# Patient Record
Sex: FEMALE | Race: WHITE | NOT HISPANIC OR LATINO | Employment: PART TIME
[De-identification: names, ages, dates, MRNs, and addresses within clinical notes are randomized per-mention and may not be internally consistent; named-entity substitution may affect disease eponyms.]

---

## 2017-01-09 ENCOUNTER — HISTORIC RESULTS (OUTPATIENT)
Dept: ADMINISTRATIVE | Age: 27
End: 2017-01-09

## 2020-08-23 ENCOUNTER — RESULTS ONLY (OUTPATIENT)
Dept: LAB | Age: 30
End: 2020-08-23

## 2020-08-23 ENCOUNTER — MEDICAL CORRESPONDENCE (OUTPATIENT)
Dept: HEALTH INFORMATION MANAGEMENT | Facility: OTHER | Age: 30
End: 2020-08-23

## 2020-08-26 LAB
SARS-COV-2 RNA SPEC QL NAA+PROBE: NOT DETECTED
SPECIMEN SOURCE: NORMAL

## 2020-09-18 ENCOUNTER — OFFICE VISIT (OUTPATIENT)
Dept: FAMILY MEDICINE | Facility: OTHER | Age: 30
End: 2020-09-18
Attending: FAMILY MEDICINE
Payer: COMMERCIAL

## 2020-09-18 VITALS
HEART RATE: 96 BPM | OXYGEN SATURATION: 98 % | TEMPERATURE: 99.6 F | DIASTOLIC BLOOD PRESSURE: 76 MMHG | BODY MASS INDEX: 35.65 KG/M2 | HEIGHT: 65 IN | WEIGHT: 214 LBS | SYSTOLIC BLOOD PRESSURE: 130 MMHG | RESPIRATION RATE: 18 BRPM

## 2020-09-18 DIAGNOSIS — R30.0 DYSURIA: Primary | ICD-10-CM

## 2020-09-18 LAB
ALBUMIN UR-MCNC: NEGATIVE MG/DL
APPEARANCE UR: CLEAR
BACTERIA #/AREA URNS HPF: ABNORMAL /HPF
BILIRUB UR QL STRIP: NEGATIVE
COLOR UR AUTO: YELLOW
GLUCOSE UR STRIP-MCNC: NEGATIVE MG/DL
HGB UR QL STRIP: ABNORMAL
KETONES UR STRIP-MCNC: NEGATIVE MG/DL
LEUKOCYTE ESTERASE UR QL STRIP: ABNORMAL
MUCOUS THREADS #/AREA URNS LPF: PRESENT /LPF
NITRATE UR QL: NEGATIVE
PH UR STRIP: 7 PH (ref 5–7)
RBC #/AREA URNS AUTO: 1 /HPF (ref 0–2)
SOURCE: ABNORMAL
SP GR UR STRIP: 1.01 (ref 1–1.03)
SQUAMOUS #/AREA URNS AUTO: 2 /HPF (ref 0–1)
UROBILINOGEN UR STRIP-MCNC: NORMAL MG/DL (ref 0–2)
WBC #/AREA URNS AUTO: 19 /HPF (ref 0–5)
YEAST #/AREA URNS HPF: ABNORMAL /HPF

## 2020-09-18 PROCEDURE — 99213 OFFICE O/P EST LOW 20 MIN: CPT | Performed by: FAMILY MEDICINE

## 2020-09-18 PROCEDURE — 87086 URINE CULTURE/COLONY COUNT: CPT | Mod: ZL | Performed by: FAMILY MEDICINE

## 2020-09-18 PROCEDURE — 81003 URINALYSIS AUTO W/O SCOPE: CPT | Mod: ZL | Performed by: FAMILY MEDICINE

## 2020-09-18 PROCEDURE — 87088 URINE BACTERIA CULTURE: CPT | Mod: ZL | Performed by: FAMILY MEDICINE

## 2020-09-18 RX ORDER — SULFAMETHOXAZOLE/TRIMETHOPRIM 800-160 MG
1 TABLET ORAL 2 TIMES DAILY
Qty: 10 TABLET | Refills: 0 | Status: SHIPPED | OUTPATIENT
Start: 2020-09-18 | End: 2020-09-23

## 2020-09-18 ASSESSMENT — MIFFLIN-ST. JEOR: SCORE: 1701.58

## 2020-09-18 ASSESSMENT — PAIN SCALES - GENERAL: PAINLEVEL: MILD PAIN (3)

## 2020-09-18 NOTE — PROGRESS NOTES
"  SUBJECTIVE:   Marilee Swanson is a 30 year old female who presents to clinic today for the following health issues:    Patient presents 1 week after onset of urinary frequency and dysuria. This morning she had increased pelvic pain, originating from her low back and radiating anteriorly.     T max at home 99.9 with chills at home.     No gross hematuria. No history of kidney stones. No appetite. + nausea, no emesis.     No vaginal discharge. No concerns for STDs.           Review of Systems  See HPI, All other systems reviewed and are otherwise negative.       OBJECTIVE:     /76 (BP Location: Left arm, Patient Position: Sitting, Cuff Size: Adult Regular)   Pulse 96   Temp 99.6  F (37.6  C) (Temporal)   Resp 18   Ht 1.651 m (5' 5\")   Wt 97.1 kg (214 lb)   LMP  (LMP Unknown)   SpO2 98%   Breastfeeding No   BMI 35.61 kg/m    Body mass index is 35.61 kg/m .  Physical Exam  Constitutional:       Appearance: She is well-developed.   HENT:      Right Ear: External ear normal.      Left Ear: External ear normal.   Eyes:      General: No scleral icterus.     Conjunctiva/sclera: Conjunctivae normal.   Cardiovascular:      Rate and Rhythm: Normal rate.   Pulmonary:      Effort: Pulmonary effort is normal. No respiratory distress.   Skin:     Findings: No rash.   Neurological:      Mental Status: She is alert.         Diagnostic Test Results:  Results for orders placed or performed in visit on 09/18/20 (from the past 24 hour(s))   UA reflex to Microscopic and Culture    Specimen: Midstream Urine   Result Value Ref Range    Color Urine Yellow     Appearance Urine Clear     Glucose Urine Negative NEG^Negative mg/dL    Bilirubin Urine Negative NEG^Negative    Ketones Urine Negative NEG^Negative mg/dL    Specific Gravity Urine 1.006 1.003 - 1.035    Blood Urine Moderate (A) NEG^Negative    pH Urine 7.0 5.0 - 7.0 pH    Protein Albumin Urine Negative NEG^Negative mg/dL    Urobilinogen mg/dL Normal 0.0 - 2.0 mg/dL "    Nitrite Urine Negative NEG^Negative    Leukocyte Esterase Urine Moderate (A) NEG^Negative    Source Midstream Urine     RBC Urine 1 0 - 2 /HPF    WBC Urine 19 (H) 0 - 5 /HPF    Bacteria Urine Moderate (A) NEG^Negative /HPF    Yeast Urine Few (A) NEG^Negative /HPF    Squamous Epithelial /HPF Urine 2 (H) 0 - 1 /HPF    Mucous Urine Present (A) NEG^Negative /LPF       ASSESSMENT/PLAN:           ICD-10-CM    1. Dysuria  R30.0 UA reflex to Microscopic and Culture     Urine Culture Aerobic Bacterial     sulfamethoxazole-trimethoprim (BACTRIM DS) 800-160 MG tablet     Would recommend empiric treatment with an antibiotic, while awaiting culture results. Return to clinic/ED if emesis prevents use of oral antibiotics, and/or fever spikes. Patient is concerned about pyelonephritis, but reassured that currently there are no clinical findings.     Delfina Hendrix MD  [facility] AND [facility]

## 2020-09-18 NOTE — NURSING NOTE
"Chief Complaint   Patient presents with     UTI     Patient presented to the clinic with a possible UTI. She first noticed symptoms about a week ago with frequency, and burning. Last night she started to have pain in her lower back that wrapped around the front and in to her pelvic area.    Initial /76 (BP Location: Left arm, Patient Position: Sitting, Cuff Size: Adult Regular)   Pulse 96   Temp 99.6  F (37.6  C) (Temporal)   Resp 18   Ht 1.651 m (5' 5\")   Wt 97.1 kg (214 lb)   LMP  (LMP Unknown)   SpO2 98%   Breastfeeding No   BMI 35.61 kg/m   Estimated body mass index is 35.61 kg/m  as calculated from the following:    Height as of this encounter: 1.651 m (5' 5\").    Weight as of this encounter: 97.1 kg (214 lb).    Medication Reconciliation: complete      Jaky Bah LPN  "

## 2020-09-20 LAB
BACTERIA SPEC CULT: ABNORMAL
SPECIMEN SOURCE: ABNORMAL

## 2020-12-09 ENCOUNTER — OFFICE VISIT (OUTPATIENT)
Dept: FAMILY MEDICINE | Facility: OTHER | Age: 30
End: 2020-12-09
Attending: NURSE PRACTITIONER
Payer: COMMERCIAL

## 2020-12-09 VITALS
TEMPERATURE: 99 F | HEART RATE: 84 BPM | RESPIRATION RATE: 16 BRPM | DIASTOLIC BLOOD PRESSURE: 74 MMHG | SYSTOLIC BLOOD PRESSURE: 128 MMHG | WEIGHT: 226 LBS | BODY MASS INDEX: 37.61 KG/M2

## 2020-12-09 DIAGNOSIS — N30.01 ACUTE CYSTITIS WITH HEMATURIA: Primary | ICD-10-CM

## 2020-12-09 DIAGNOSIS — R31.9 HEMATURIA, UNSPECIFIED TYPE: ICD-10-CM

## 2020-12-09 LAB
ALBUMIN UR-MCNC: NEGATIVE MG/DL
APPEARANCE UR: ABNORMAL
BACTERIA #/AREA URNS HPF: ABNORMAL /HPF
BILIRUB UR QL STRIP: NEGATIVE
COLOR UR AUTO: ABNORMAL
GLUCOSE UR STRIP-MCNC: NEGATIVE MG/DL
HGB UR QL STRIP: ABNORMAL
KETONES UR STRIP-MCNC: NEGATIVE MG/DL
LEUKOCYTE ESTERASE UR QL STRIP: ABNORMAL
MUCOUS THREADS #/AREA URNS LPF: PRESENT /LPF
NITRATE UR QL: NEGATIVE
PH UR STRIP: 7 PH (ref 5–7)
RBC #/AREA URNS AUTO: 80 /HPF (ref 0–2)
SOURCE: ABNORMAL
SP GR UR STRIP: 1.01 (ref 1–1.03)
SQUAMOUS #/AREA URNS AUTO: 3 /HPF (ref 0–1)
UROBILINOGEN UR STRIP-MCNC: NORMAL MG/DL (ref 0–2)
WBC #/AREA URNS AUTO: 7 /HPF (ref 0–5)
YEAST #/AREA URNS HPF: ABNORMAL /HPF

## 2020-12-09 PROCEDURE — 81001 URINALYSIS AUTO W/SCOPE: CPT | Mod: ZL | Performed by: NURSE PRACTITIONER

## 2020-12-09 PROCEDURE — 99213 OFFICE O/P EST LOW 20 MIN: CPT | Performed by: NURSE PRACTITIONER

## 2020-12-09 RX ORDER — SULFAMETHOXAZOLE/TRIMETHOPRIM 800-160 MG
1 TABLET ORAL 2 TIMES DAILY
Qty: 10 TABLET | Refills: 0 | Status: SHIPPED | OUTPATIENT
Start: 2020-12-09 | End: 2020-12-14

## 2020-12-09 ASSESSMENT — PAIN SCALES - GENERAL: PAINLEVEL: NO PAIN (0)

## 2020-12-09 NOTE — PATIENT INSTRUCTIONS

## 2020-12-09 NOTE — PROGRESS NOTES
HPI:    Marilee Swanson is a 30 year old female  who presents to [facility] today for hematuria. The patient states that she just noticed the blood in her urine yesterday. She reports having some urgency. Felt like she was starting to get a bladder infection about 1 week ago. At that time she took cranberry pills and her symptoms had resolved. Patient states that she is not noticing blood in the urine every time she urinates. Patient reports noticing one clot yesterday. The Patient has a history of bladder infections. Denies fevers or chills. Patient states that she last menstruated 2 weeks ago, this is not the cause of the gross hematuria.       Past Medical History:   Diagnosis Date     History of other genital system and obstetric disorders      - NSVDs at term     Past Surgical History:   Procedure Laterality Date     OTHER SURGICAL HISTORY      [id number],NO PREVIOUS SURGERY     Social History     Tobacco Use     Smoking status: Current Every Day Smoker     Packs/day: 0.01     Years: 2.00     Pack years: 0.02     Types: Cigarettes     Last attempt to quit: 2012     Years since quittin.2     Smokeless tobacco: Never Used   Substance Use Topics     Alcohol use: No     No current outpatient medications on file.     No Known Allergies      Past medical history, past surgical history, current medications and allergies reviewed and accurate to the best of my knowledge.        ROS:  Refer to HPI    /74 (BP Location: Right arm, Patient Position: Sitting, Cuff Size: Adult Regular)   Pulse 84   Temp 99  F (37.2  C) (Tympanic)   Resp 16   Wt 102.5 kg (226 lb)   BMI 37.61 kg/m      EXAM:  General Appearance: Well appearing female, appropriate appearance for age. No acute distress  Respiratory: normal chest wall and respirations.  Normal effort.  Clear to auscultation bilaterally, no wheezing, crackles or rhonchi.  No increased work of breathing.  No cough appreciated.  Cardiac: RRR with no  murmurs  Abdomen: soft, nontender, no rigidity, no rebound tenderness or guarding, normal bowel sounds present  :  No suprapubic tenderness to palpation.  No CVA tenderness to palpation.    Dermatological: no rashes noted of exposed skin  Psychological: normal affect, alert, oriented, and pleasant.       Labs:  Results for orders placed or performed in visit on 12/09/20   UA reflex to Microscopic and Culture     Status: Abnormal    Specimen: Midstream Urine   Result Value Ref Range    Color Urine Light Yellow     Appearance Urine Slightly Cloudy     Glucose Urine Negative NEG^Negative mg/dL    Bilirubin Urine Negative NEG^Negative    Ketones Urine Negative NEG^Negative mg/dL    Specific Gravity Urine 1.008 1.003 - 1.035    Blood Urine Moderate (A) NEG^Negative    pH Urine 7.0 5.0 - 7.0 pH    Protein Albumin Urine Negative NEG^Negative mg/dL    Urobilinogen mg/dL Normal 0.0 - 2.0 mg/dL    Nitrite Urine Negative NEG^Negative    Leukocyte Esterase Urine Small (A) NEG^Negative    Source Midstream Urine     RBC Urine 80 (H) 0 - 2 /HPF    WBC Urine 7 (H) 0 - 5 /HPF    Bacteria Urine Few (A) NEG^Negative /HPF    Yeast Urine Few (A) NEG^Negative /HPF    Squamous Epithelial /HPF Urine 3 (H) 0 - 1 /HPF    Mucous Urine Present (A) NEG^Negative /LPF           ASSESSMENT/PLAN:  1. Acute cystitis with hematuria    - sulfamethoxazole-trimethoprim (BACTRIM DS) 800-160 MG tablet; Take 1 tablet by mouth 2 times daily for 5 days  Dispense: 10 tablet; Refill: 0    2. Hematuria, unspecified type    - UA reflex to Microscopic and Culture    UA results indicate a UTI, this was discussed with the patient.   Patient will be treated empirically with bactrim DS x 5 days.     Culture and sensitivity pending. The patient's course of antibiotic may need to be changed based on the culture and sensitivity results.     Symptomatic treatment - Encouraged fluids    May use over-the-counter Tylenol or ibuprofen PRN    Discussed warning  signs/symptoms indicative of need to f/u    Follow up if symptoms persist or worsen or concerns      I explained my diagnostic considerations and recommendations to the patient, who voiced understanding and agreement with the treatment plan. All questions were answered. We discussed potential side effects of any prescribed or recommended therapies, as well as expectations for response to treatments.    Disclaimer:  This note consists of words and symbols derived from keyboarding, dictation, or using voice recognition software. As a result, there may be errors in the script that have gone undetected. Please consider this when interpreting information found in this note.

## 2020-12-09 NOTE — NURSING NOTE
"Patient presents to the clinic for hematuria the past 24 hours.      Urine analysis initiated per verbal orderthat was read back and verified.      Chief Complaint   Patient presents with     Hematuria       Initial /74 (BP Location: Right arm, Patient Position: Sitting, Cuff Size: Adult Regular)   Pulse 84   Temp 99  F (37.2  C) (Tympanic)   Resp 16   Wt 102.5 kg (226 lb)   BMI 37.61 kg/m   Estimated body mass index is 37.61 kg/m  as calculated from the following:    Height as of 9/18/20: 1.651 m (5' 5\").    Weight as of this encounter: 102.5 kg (226 lb).  Medication Reconciliation: complete    Lanette Del Rosario LPN      "

## 2020-12-27 ENCOUNTER — HEALTH MAINTENANCE LETTER (OUTPATIENT)
Age: 30
End: 2020-12-27

## 2021-06-22 ENCOUNTER — HOSPITAL ENCOUNTER (EMERGENCY)
Facility: HOSPITAL | Age: 31
Discharge: HOME | End: 2021-06-22
Attending: NURSE PRACTITIONER | Admitting: NURSE PRACTITIONER
Payer: COMMERCIAL

## 2021-06-22 VITALS
TEMPERATURE: 97.9 F | DIASTOLIC BLOOD PRESSURE: 100 MMHG | RESPIRATION RATE: 16 BRPM | SYSTOLIC BLOOD PRESSURE: 180 MMHG | HEART RATE: 61 BPM | OXYGEN SATURATION: 99 %

## 2021-06-22 DIAGNOSIS — I10 ESSENTIAL HYPERTENSION, BENIGN: Primary | ICD-10-CM

## 2021-06-22 LAB
ALBUMIN SERPL-MCNC: 3.6 G/DL (ref 3.4–5)
ALP SERPL-CCNC: 96 U/L (ref 40–150)
ALT SERPL W P-5'-P-CCNC: 54 U/L (ref 0–50)
ANION GAP SERPL CALCULATED.3IONS-SCNC: 6 MMOL/L (ref 3–14)
AST SERPL W P-5'-P-CCNC: 29 U/L (ref 0–45)
B-HCG SERPL-ACNC: <1 IU/L (ref 0–5)
BASOPHILS # BLD AUTO: 0 10E9/L (ref 0–0.2)
BASOPHILS NFR BLD AUTO: 0.5 %
BILIRUB SERPL-MCNC: 0.2 MG/DL (ref 0.2–1.3)
BUN SERPL-MCNC: 9 MG/DL (ref 7–30)
CALCIUM SERPL-MCNC: 8.8 MG/DL (ref 8.5–10.1)
CHLORIDE SERPL-SCNC: 107 MMOL/L (ref 94–109)
CO2 SERPL-SCNC: 26 MMOL/L (ref 20–32)
CREAT SERPL-MCNC: 0.6 MG/DL (ref 0.52–1.04)
DIFFERENTIAL METHOD BLD: NORMAL
EOSINOPHIL # BLD AUTO: 0.4 10E9/L (ref 0–0.7)
EOSINOPHIL NFR BLD AUTO: 4.6 %
ERYTHROCYTE [DISTWIDTH] IN BLOOD BY AUTOMATED COUNT: 12.5 % (ref 10–15)
GFR SERPL CREATININE-BSD FRML MDRD: >90 ML/MIN/{1.73_M2}
GLUCOSE SERPL-MCNC: 82 MG/DL (ref 70–99)
HCT VFR BLD AUTO: 42.3 % (ref 35–47)
HGB BLD-MCNC: 14.7 G/DL (ref 11.7–15.7)
IMM GRANULOCYTES # BLD: 0 10E9/L (ref 0–0.4)
IMM GRANULOCYTES NFR BLD: 0.1 %
LYMPHOCYTES # BLD AUTO: 3.6 10E9/L (ref 0.8–5.3)
LYMPHOCYTES NFR BLD AUTO: 47.8 %
MCH RBC QN AUTO: 30.6 PG (ref 26.5–33)
MCHC RBC AUTO-ENTMCNC: 34.8 G/DL (ref 31.5–36.5)
MCV RBC AUTO: 88 FL (ref 78–100)
MONOCYTES # BLD AUTO: 0.5 10E9/L (ref 0–1.3)
MONOCYTES NFR BLD AUTO: 5.9 %
NEUTROPHILS # BLD AUTO: 3.1 10E9/L (ref 1.6–8.3)
NEUTROPHILS NFR BLD AUTO: 41.1 %
NRBC # BLD AUTO: 0 10*3/UL
NRBC BLD AUTO-RTO: 0 /100
PLATELET # BLD AUTO: 245 10E9/L (ref 150–450)
POTASSIUM SERPL-SCNC: 3.6 MMOL/L (ref 3.4–5.3)
PROT SERPL-MCNC: 7.7 G/DL (ref 6.8–8.8)
RBC # BLD AUTO: 4.81 10E12/L (ref 3.8–5.2)
SODIUM SERPL-SCNC: 139 MMOL/L (ref 133–144)
TSH SERPL DL<=0.005 MIU/L-ACNC: 0.94 MU/L (ref 0.4–4)
WBC # BLD AUTO: 7.6 10E9/L (ref 4–11)

## 2021-06-22 PROCEDURE — G0463 HOSPITAL OUTPT CLINIC VISIT: HCPCS | Mod: 25

## 2021-06-22 PROCEDURE — 36415 COLL VENOUS BLD VENIPUNCTURE: CPT | Performed by: NURSE PRACTITIONER

## 2021-06-22 PROCEDURE — 93005 ELECTROCARDIOGRAM TRACING: CPT

## 2021-06-22 PROCEDURE — 84443 ASSAY THYROID STIM HORMONE: CPT | Performed by: NURSE PRACTITIONER

## 2021-06-22 PROCEDURE — 93010 ELECTROCARDIOGRAM REPORT: CPT | Performed by: INTERNAL MEDICINE

## 2021-06-22 PROCEDURE — 84702 CHORIONIC GONADOTROPIN TEST: CPT | Performed by: NURSE PRACTITIONER

## 2021-06-22 PROCEDURE — 85025 COMPLETE CBC W/AUTO DIFF WBC: CPT | Performed by: NURSE PRACTITIONER

## 2021-06-22 PROCEDURE — 99214 OFFICE O/P EST MOD 30 MIN: CPT | Performed by: NURSE PRACTITIONER

## 2021-06-22 PROCEDURE — 80053 COMPREHEN METABOLIC PANEL: CPT | Performed by: NURSE PRACTITIONER

## 2021-06-22 RX ORDER — LISINOPRIL 5 MG/1
5 TABLET ORAL DAILY
Qty: 30 TABLET | Refills: 0 | Status: SHIPPED | OUTPATIENT
Start: 2021-06-22 | End: 2022-07-27

## 2021-06-22 ASSESSMENT — ENCOUNTER SYMPTOMS
EYE DISCHARGE: 0
NECK PAIN: 0
CHEST TIGHTNESS: 0
SINUS PAIN: 0
SORE THROAT: 0
EYE ITCHING: 0
FATIGUE: 0
COUGH: 0
VOMITING: 0
HEADACHES: 1
PHOTOPHOBIA: 0
NAUSEA: 0
EYE PAIN: 0
ABDOMINAL PAIN: 0
DIZZINESS: 0
SHORTNESS OF BREATH: 1
RHINORRHEA: 0
NUMBNESS: 1
TROUBLE SWALLOWING: 0
EYE REDNESS: 0
PSYCHIATRIC NEGATIVE: 1
MYALGIAS: 0
SINUS PRESSURE: 0
LIGHT-HEADEDNESS: 1
PALPITATIONS: 0
CHILLS: 0
FEVER: 0
NECK STIFFNESS: 0
WEAKNESS: 0
DIARRHEA: 0

## 2021-06-22 NOTE — DISCHARGE INSTRUCTIONS
Follow up with Dr. Feng regarding elevated blood pressure and to re-establish care since you moved back to the area.     Low caffeine diet    Low Salt diet    Manage stress    Monitor blood pressures twice daily    Follow up with primary care provider or return to urgent care/ED with any worsening in condition or additional concerns.

## 2021-06-22 NOTE — ED TRIAGE NOTES
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  Reports numbness in left arm which started this past weekend.  Denies any recent injuries.  Denies any pains or SOB.

## 2021-06-22 NOTE — ED TRIAGE NOTES
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  "

## 2021-06-22 NOTE — ED PROVIDER NOTES
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  Symptoms resolved except left arm numbness/tingling and elevated blood pressure.  Patient states still has a mild headache but not bad.  Patient took ASA 81mg for headache which was helpful.  Patient state she had hypertension during each pregnancy, otherwise it has been stable.  Patient states \"this stuff has happened to me before I was just going to go home and rest but they told me to come in.\"  No other concerns.     Allergies:  No Known Allergies    Problem List:    There are no active problems to display for this patient.       Past Medical History:    Past Medical History:   Diagnosis Date     History of other genital system and obstetric disorders        Past Surgical History:    Past Surgical History:   Procedure Laterality Date     OTHER SURGICAL HISTORY      [id number],NO PREVIOUS SURGERY       Family History:    Family History   Problem Relation Age of Onset     Other - See Comments Mother         renal disease.     Seizure Disorder Son         Seizures,?febrile sz     Heart Disease Maternal Grandfather         Heart Disease,Heart valve surgery     Family History Negative Son         Good Health     Other - See Comments Son         Congenital hemangioma       Social History:  Marital Status:   [2]  Social History     Tobacco Use     Smoking status: Current Every Day Smoker     Packs/day: 0.01     Years: 2.00     Pack years: 0.02     Types: Cigarettes     Last attempt to quit: 2012     Years since quittin.8     Smokeless tobacco: Never Used   Substance Use Topics     Alcohol use: No     Drug use: Never        Medications:    lisinopril (ZESTRIL) 5 MG " tablet      Review of Systems   Constitutional: Negative for chills, fatigue and fever.   HENT: Negative for congestion, ear pain, rhinorrhea, sinus pressure, sinus pain, sore throat and trouble swallowing.    Eyes: Positive for visual disturbance (blurred vision resolved). Negative for photophobia, pain, discharge, redness and itching.   Respiratory: Positive for shortness of breath (resolved). Negative for cough and chest tightness.    Cardiovascular: Negative for chest pain and palpitations.   Gastrointestinal: Negative for abdominal pain, diarrhea, nausea and vomiting.   Genitourinary: Negative for decreased urine volume.   Musculoskeletal: Negative for gait problem, myalgias, neck pain and neck stiffness.   Skin: Negative for rash.   Neurological: Positive for light-headedness, numbness and headaches. Negative for dizziness, syncope and weakness.   Psychiatric/Behavioral: Negative.      Physical Exam   BP: (!) 176/109  Pulse: 61  Temp: 97.9  F (36.6  C)  Resp: 16  SpO2: 99 %    Physical Exam  Vitals signs and nursing note reviewed.   Constitutional:       General: She is not in acute distress.     Appearance: She is not ill-appearing.   HENT:      Head: Normocephalic.      Right Ear: Tympanic membrane, ear canal and external ear normal.      Left Ear: Tympanic membrane, ear canal and external ear normal.      Nose: Nose normal. No congestion or rhinorrhea.      Mouth/Throat:      Mouth: Mucous membranes are moist.      Pharynx: Oropharynx is clear.   Eyes:      Extraocular Movements: Extraocular movements intact.      Conjunctiva/sclera: Conjunctivae normal.      Pupils: Pupils are equal, round, and reactive to light.   Neck:      Musculoskeletal: Normal range of motion and neck supple. No neck rigidity or muscular tenderness.   Cardiovascular:      Rate and Rhythm: Normal rate and regular rhythm.      Pulses: Normal pulses.      Heart sounds: Normal heart sounds.   Pulmonary:      Effort: Pulmonary effort is  normal.      Breath sounds: Normal breath sounds.   Abdominal:      General: Bowel sounds are normal.      Palpations: Abdomen is soft.      Tenderness: There is no abdominal tenderness.   Musculoskeletal: Normal range of motion.      Left shoulder: Normal.   Skin:     General: Skin is warm and dry.      Capillary Refill: Capillary refill takes less than 2 seconds.   Neurological:      General: No focal deficit present.      Mental Status: She is alert.      GCS: GCS eye subscore is 4. GCS verbal subscore is 5. GCS motor subscore is 6.   Psychiatric:         Mood and Affect: Mood normal.       ED Course          EKG Interpretation:      Interpreted by Danielle Schreiber NP  Time reviewed: 1308  Symptoms at time of EKG: Hypertension, blurred vision, and left arm numbness and tingling.   Rhythm: sinus bradycardia  Rate: 50-60  Clinical Impression: no acute changes    Results for orders placed or performed during the hospital encounter of 06/22/21 (from the past 24 hour(s))   CBC with platelets differential   Result Value Ref Range    WBC 7.6 4.0 - 11.0 10e9/L    RBC Count 4.81 3.8 - 5.2 10e12/L    Hemoglobin 14.7 11.7 - 15.7 g/dL    Hematocrit 42.3 35.0 - 47.0 %    MCV 88 78 - 100 fl    MCH 30.6 26.5 - 33.0 pg    MCHC 34.8 31.5 - 36.5 g/dL    RDW 12.5 10.0 - 15.0 %    Platelet Count 245 150 - 450 10e9/L    Diff Method Automated Method     % Neutrophils 41.1 %    % Lymphocytes 47.8 %    % Monocytes 5.9 %    % Eosinophils 4.6 %    % Basophils 0.5 %    % Immature Granulocytes 0.1 %    Nucleated RBCs 0 0 /100    Absolute Neutrophil 3.1 1.6 - 8.3 10e9/L    Absolute Lymphocytes 3.6 0.8 - 5.3 10e9/L    Absolute Monocytes 0.5 0.0 - 1.3 10e9/L    Absolute Eosinophils 0.4 0.0 - 0.7 10e9/L    Absolute Basophils 0.0 0.0 - 0.2 10e9/L    Abs Immature Granulocytes 0.0 0 - 0.4 10e9/L    Absolute Nucleated RBC 0.0    HCG quantitative pregnancy (blood)   Result Value Ref Range    HCG Quantitative Serum <1 0 - 5 IU/L   Comprehensive  metabolic panel   Result Value Ref Range    Sodium 139 133 - 144 mmol/L    Potassium 3.6 3.4 - 5.3 mmol/L    Chloride 107 94 - 109 mmol/L    Carbon Dioxide 26 20 - 32 mmol/L    Anion Gap 6 3 - 14 mmol/L    Glucose 82 70 - 99 mg/dL    Urea Nitrogen 9 7 - 30 mg/dL    Creatinine 0.60 0.52 - 1.04 mg/dL    GFR Estimate >90 >60 mL/min/[1.73_m2]    GFR Estimate If Black >90 >60 mL/min/[1.73_m2]    Calcium 8.8 8.5 - 10.1 mg/dL    Bilirubin Total 0.2 0.2 - 1.3 mg/dL    Albumin 3.6 3.4 - 5.0 g/dL    Protein Total 7.7 6.8 - 8.8 g/dL    Alkaline Phosphatase 96 40 - 150 U/L    ALT 54 (H) 0 - 50 U/L    AST 29 0 - 45 U/L   TSH   Result Value Ref Range    TSH 0.94 0.40 - 4.00 mU/L       Medications - No data to display    Assessments & Plan (with Medical Decision Making)     I have reviewed the nursing notes.    I have reviewed the findings, diagnosis, plan and need for follow up with the patient.  (I10) Essential hypertension, benign  (primary encounter diagnosis)  Plan:   -EKG completed-SB 50's  -TSH, CMP, CBC Unremarkable  -NEGATIVE HCG  -neuro exam unremarkable  -Patient does not have a PCP but wants to follow up with Dr. Feng who patient had for a PCP prior to moving away and recently returned to the area again.  Patient states will call and schedule an appointment after leaving .   -Will start patient on Lisinopril 5mg and have her monitor blood pressure twice daily and return as needed  -Patient to follow low salt/low caffeine diet  -manage stress   Reviewed with ED provider, patient okay to discharge home.  Patient has no other concerns at this time and is in agreement with treatment plan.    Discharge Medication List as of 6/22/2021  2:07 PM      START taking these medications    Details   lisinopril (ZESTRIL) 5 MG tablet Take 1 tablet (5 mg) by mouth daily for 30 doses, Disp-30 tablet, R-0, E-Prescribe           Final diagnoses:   Essential hypertension, benign     6/22/2021   [facility]     Abdoul  Danielle VANEGAS, GLADIS  06/22/21 1557

## 2021-10-08 ENCOUNTER — ALLIED HEALTH/NURSE VISIT (OUTPATIENT)
Dept: FAMILY MEDICINE | Facility: OTHER | Age: 31
End: 2021-10-08
Attending: FAMILY MEDICINE
Payer: COMMERCIAL

## 2021-10-08 DIAGNOSIS — R43.2 LOSS OF TASTE: Primary | ICD-10-CM

## 2021-10-08 PROCEDURE — C9803 HOPD COVID-19 SPEC COLLECT: HCPCS

## 2021-10-08 PROCEDURE — U0003 INFECTIOUS AGENT DETECTION BY NUCLEIC ACID (DNA OR RNA); SEVERE ACUTE RESPIRATORY SYNDROME CORONAVIRUS 2 (SARS-COV-2) (CORONAVIRUS DISEASE [COVID-19]), AMPLIFIED PROBE TECHNIQUE, MAKING USE OF HIGH THROUGHPUT TECHNOLOGIES AS DESCRIBED BY CMS-2020-01-R: HCPCS | Mod: ZL

## 2021-10-09 ENCOUNTER — HEALTH MAINTENANCE LETTER (OUTPATIENT)
Age: 31
End: 2021-10-09

## 2021-10-10 LAB — SARS-COV-2 RNA RESP QL NAA+PROBE: POSITIVE

## 2022-01-29 ENCOUNTER — HEALTH MAINTENANCE LETTER (OUTPATIENT)
Age: 32
End: 2022-01-29

## 2022-07-27 ENCOUNTER — OFFICE VISIT (OUTPATIENT)
Dept: FAMILY MEDICINE | Facility: OTHER | Age: 32
End: 2022-07-27
Attending: PHYSICIAN ASSISTANT

## 2022-07-27 VITALS
RESPIRATION RATE: 16 BRPM | BODY MASS INDEX: 34.02 KG/M2 | SYSTOLIC BLOOD PRESSURE: 158 MMHG | TEMPERATURE: 98.4 F | HEIGHT: 65 IN | HEART RATE: 71 BPM | OXYGEN SATURATION: 98 % | WEIGHT: 204.2 LBS | DIASTOLIC BLOOD PRESSURE: 100 MMHG

## 2022-07-27 DIAGNOSIS — L30.9 PERIORBITAL DERMATITIS: Primary | ICD-10-CM

## 2022-07-27 PROCEDURE — 99213 OFFICE O/P EST LOW 20 MIN: CPT | Performed by: PHYSICIAN ASSISTANT

## 2022-07-27 RX ORDER — DESONIDE 0.5 MG/G
CREAM TOPICAL 2 TIMES DAILY
Qty: 60 G | Refills: 0 | Status: SHIPPED | OUTPATIENT
Start: 2022-07-27 | End: 2023-06-30

## 2022-07-27 ASSESSMENT — PAIN SCALES - GENERAL: PAINLEVEL: NO PAIN (0)

## 2022-07-27 NOTE — PROGRESS NOTES
"ASSESSMENT/PLAN:    I have reviewed the nursing notes.  I have reviewed the findings, diagnosis, plan and need for follow up with the patient.    1. Periorbital dermatitis  - desonide (DESOWEN) 0.05 % external cream; Apply topically 2 times daily  Dispense: 60 g; Refill: 0  -Periorbital dermatitis (bilateral) noted, no new dyes/perfumes/detergents/grasses/pets to indicate allergic picture. Rash most consistent with eczema. Discussed treatment with low potency steroids (P6-P7), we will trial Desonide. Apply cream twice a day for up to 2 weeks, then take 1-2 weeks off if needed and may resume after this if needed.   -Eczema often affects the hands/elbows/flexor \"bending\" areas of the body, it's exact cause is unknown, it can sometimes run in families.   -Treatment typically includes [place] free moisturizers that include ceramides, glycerins and/or mineral oil (ie: Cetaphil or CeraVe).   -Over the counter creams and oinments such as: Cortisone-10, Hydrocortisone creams may help with itching, swelling and redness.   -Most often, a follow up with your PCP is all you need, however, sometimes you may be referred to dermatology as well for further evaluation.   -If symptoms change or worsen, you can follow up with you PCP or the ER/UC.   -Worsening signs include further spread of the rash (ie: all over body), associated fever, blistering of rash, increased pain or if rash becomes infected.   -Patient is in agreement and understanding of the above treatment plan. All questions and concerns were addressed and answered to patient's satisfaction. AVS reviewed with patient.     Discussed warning signs/symptoms indicative of need to f/u    Follow up if symptoms persist or worsen or concerns    I explained my diagnostic considerations and recommendations to the patient, who voiced understanding and agreement with the treatment plan. All questions were answered. We discussed potential side effects of any prescribed or " R3 Antepartum Progress Note - HD#2    Subjective  Patient seen and examined at bedside, no acute overnight events. No acute complaints, pain well controlled. Patient is ambulating, voiding spontaneously, passing gas, and tolerating regular diet. Pt is __________ feeding her baby.    Vital Signs Last 24 Hours  T(C): 36.4 (10-08-19 @ 04:29), Max: 36.9 (10-07-19 @ 11:24)  HR: 86 (10-08-19 @ 04:58) (85 - 115)  BP: 103/57 (10-08-19 @ 04:44) (81/47 - 131/75)  RR: 18 (10-07-19 @ 11:26) (14 - 18)  SpO2: 99% (10-08-19 @ 04:58) (90% - 100%)    Physical Exam:  General: NAD  Abdomen: Soft, non-tender, non-distended, fundus firm  Pelvic: Lochia wnl    Labs:    Blood Type: O Positive  Antibody Screen: --  RPR: Negative               11.1   12.24 )-----------( 195      ( 10-07 @ 12:00 )             33.6         MEDICATIONS  (STANDING):  ampicillin  IVPB 1 Gram(s) IV Intermittent every 4 hours  aspirin  chewable 81 milliGRAM(s) Oral daily  betamethasone Injectable 12 milliGRAM(s) IntraMuscular every 24 hours  influenza   Vaccine 0.5 milliLiter(s) IntraMuscular once  lactated ringers. 1000 milliLiter(s) (75 mL/Hr) IV Continuous <Continuous>  magnesium sulfate Infusion 2 Gm/Hr (50 mL/Hr) IV Continuous <Continuous>  oxytocin Infusion  milliUNIT(s)/Min (1000 mL/Hr) IV Continuous <Continuous>  prenatal multivitamin 1 Tablet(s) Oral daily  progesterone Vaginal Insert 200 milliGRAM(s) Vaginal at bedtime    MEDICATIONS  (PRN):      Brigitte Guardado MD recommended therapies, as well as expectations for response to treatments.    Elisabeth Reich  2022  2:59 PM    I was present with Elisabeth Reich, NP student who participated in the service and in the documentation of the note. I have verified the history and personally performed the physical exam and medical decision making. I agree with the assessment and plan of care as documented in the note.     Daxa Lyn PA-C  2022  3:08 PM    HPI:    Marilee Swanson is a 32 year old female  who [facility] for concerns of bilateral eye burning, swelling and redness that started on Monday. She reports Monday afternoon she started to have some tearing and burning and by the time she went to bed they were both swollen Tuesday they were about swollen shut in th morning. She took Claritin in the morning on Tuesday and it had slightly improved, she took benadryl last night and woke up with swollen shut eyes again this morning. Took a Claritin again today. She thought may a allergic reaction, no new make-up, lotion.     Eye Sx: discharge/drainage, burning, swelling, pain, redness  Problem/Context: unknown  History: none    Contact or glasses use: No  Changes in vision: No  Change in eye ROM: No  Presence of Flashers/Floaters: No  Discharge description: yellow and in the corner of her eyes in the morning  Presence of URI Symptoms: No  Eyelid (any symptoms): YES erythema, edema, burning  Any exposure to trauma or chemical burns to eye: No    Treatments Tried: Benadryl, Claritin,  , ice packs, warm wash cloth    Prior eye or sinus surgeries: No  Similar symptoms in the past: dry skin around eyes, but nothing like this    PCP: none    Past Medical History:   Diagnosis Date     History of other genital system and obstetric disorders      - NSVDs at term     Past Surgical History:   Procedure Laterality Date     OTHER SURGICAL HISTORY      [id number],NO PREVIOUS SURGERY     Social History     Tobacco  "Use     Smoking status: Current Every Day Smoker     Packs/day: 0.01     Years: 2.00     Pack years: 0.02     Types: Cigarettes     Last attempt to quit: 2012     Years since quittin.9     Smokeless tobacco: Never Used   Substance Use Topics     Alcohol use: No     Current Outpatient Medications   Medication Sig Dispense Refill     lisinopril (ZESTRIL) 5 MG tablet Take 1 tablet (5 mg) by mouth daily for 30 doses 30 tablet 0     No Known Allergies  Past medical history, past surgical history, current medications and allergies reviewed and accurate to the best of my knowledge.      ROS:  Refer to HPI    BP (!) 158/100 (BP Location: Right arm, Patient Position: Sitting, Cuff Size: Adult Regular)   Pulse 71   Temp 98.4  F (36.9  C) (Temporal)   Resp 16   Ht 1.651 m (5' 5\")   Wt 92.6 kg (204 lb 3.2 oz)   SpO2 98%   Breastfeeding No   BMI 33.98 kg/m      EXAM:  General Appearance: Well appearing 32 year old female, appropriate appearance for age. No acute distress   Eyes: conjunctivae normal without erythema or irritation, corneas clear, no drainage or crusting, + minor eyelid swelling bilaterally, pupils equal   Respiratory: Normal chest wall and respirations. Non-labored breathing. Normal respiratory rate.  Cardiac: RRR, no murmurs.   Dermatological: bilateral eye lid erythema, scaly dry skin to upper eye lid bilaterally, lower right eye lid  Psychological: normal affect, alert, oriented, and pleasant.     Labs:  None     Xray:  None   " R3 Antepartum Progress Note - HD#2    Subjective  Patient seen and examined at bedside, no acute overnight events. This AM, she reports no acute complaints. Reports good fetal movement. Denies CTX, LOF, VB, fever, chills, CP, SOB, LE edema/tenderness.    Objective  Vital Signs Last 24 Hours  T(C): 36.4 (10-08-19 @ 04:29), Max: 36.9 (10-07-19 @ 11:24)  HR: 86 (10-08-19 @ 04:58) (85 - 115)  BP: 103/57 (10-08-19 @ 04:44) (81/47 - 131/75)  RR: 18 (10-07-19 @ 11:26) (14 - 18)  SpO2: 99% (10-08-19 @ 04:58) (90% - 100%)    Physical Exam:  General: NAD  CV: RRR, no murmurs, S1, S2  Resp: CTA-B, symmetrical expansion  Abdomen: Soft, non-tender, non-distended, gravid  Ext: no edema/tenderness in LE b/l    NST overnight: 125, mod variability, +accels, neg decels  [place]: neg ctx    Labs:    Blood Type: O Positive  Antibody Screen: --  RPR: Negative               11.1   12.24 )-----------( 195      ( 10-07 @ 12:00 )             33.6         MEDICATIONS  (STANDING):  ampicillin  IVPB 1 Gram(s) IV Intermittent every 4 hours  aspirin  chewable 81 milliGRAM(s) Oral daily  betamethasone Injectable 12 milliGRAM(s) IntraMuscular every 24 hours  influenza   Vaccine 0.5 milliLiter(s) IntraMuscular once  lactated ringers. 1000 milliLiter(s) (75 mL/Hr) IV Continuous <Continuous>  magnesium sulfate Infusion 2 Gm/Hr (50 mL/Hr) IV Continuous <Continuous>  oxytocin Infusion  milliUNIT(s)/Min (1000 mL/Hr) IV Continuous <Continuous>  prenatal multivitamin 1 Tablet(s) Oral daily  progesterone Vaginal Insert 200 milliGRAM(s) Vaginal at bedtime    MEDICATIONS  (PRN):

## 2022-07-27 NOTE — PATIENT INSTRUCTIONS
"Please refer to your AVS for follow up and pain/symptoms management recommendations (I.e.: medications, helpful conservative treatment modalities, appropriate follow up if need to a specialist or family practice, etc.). Please return to urgent care if your symptoms change or worsen.     Discharge instructions:  -If you were prescribed a medication(s), please take this as prescribed/directed  -Monitor your symptoms, if changing/worsening, return to UC/ER or PCP for follow up    Atopic Dermatitis/Eczema   -Eczema often affects the hands/elbows/flexor \"bending\" areas of the body, it's exact cause is unknown, it can sometimes run in families.   -Treatment typically includes [place] free moisturizers that include ceramides, glycerins and/or mineral oil (ie: Cetaphil or CeraVe).   -Over the counter creams and oinments such as: Cortisone-10, Hydrocortisone creams may help with itching, swelling and redness. Cortisone pills and/or shots are used in more severe cases. -Most often, a follow up with your PCP is all you need, however, sometimes you may be referred to dermatology as well for further evaluation.   -If symptoms change or worsen, you can follow up with you PCP or the ER/UC.   -Worsening signs include further spread of the rash (ie: all over body), associated fever, blistering of rash, increased pain or if rash becomes infected.     - Apply cream twice a day for up to 2 weeks, then take 1-2 weeks off if needed and may resume after this if needed    "

## 2022-07-27 NOTE — NURSING NOTE
"Chief Complaint   Patient presents with     Eye Problem     Eyelids red swollen itchy  painful   started on 7-25-22         Medication reconciliation completed.    FOOD SECURITY SCREENING QUESTIONS:    The next two questions are to help us understand your food security.  If you are feeling you need any assistance in this area, we have resources available to support you today.    Hunger Vital Signs:  Within the past 12 months we worried whether our food would run out before we got money to buy more. Never  Within the past 12 months the food we bought just didn't last and we didn't have money to get more. Never    Initial BP (!) 158/100 (BP Location: Right arm, Patient Position: Sitting, Cuff Size: Adult Regular)   Pulse 71   Temp 98.4  F (36.9  C) (Temporal)   Resp 16   Ht 1.651 m (5' 5\")   Wt 92.6 kg (204 lb 3.2 oz)   SpO2 98%   Breastfeeding No   BMI 33.98 kg/m   Estimated body mass index is 33.98 kg/m  as calculated from the following:    Height as of this encounter: 1.651 m (5' 5\").    Weight as of this encounter: 92.6 kg (204 lb 3.2 oz).       Candice Medeiros LPN .......  7/27/2022  2:42 PM  "

## 2022-08-02 ENCOUNTER — OFFICE VISIT (OUTPATIENT)
Dept: FAMILY MEDICINE | Facility: OTHER | Age: 32
End: 2022-08-02
Attending: FAMILY MEDICINE
Payer: COMMERCIAL

## 2022-08-02 VITALS
SYSTOLIC BLOOD PRESSURE: 162 MMHG | HEART RATE: 69 BPM | BODY MASS INDEX: 33.85 KG/M2 | RESPIRATION RATE: 16 BRPM | WEIGHT: 203.4 LBS | TEMPERATURE: 98.5 F | OXYGEN SATURATION: 100 % | DIASTOLIC BLOOD PRESSURE: 98 MMHG

## 2022-08-02 DIAGNOSIS — L30.9 DERMATITIS: Primary | ICD-10-CM

## 2022-08-02 PROCEDURE — 99213 OFFICE O/P EST LOW 20 MIN: CPT | Performed by: FAMILY MEDICINE

## 2022-08-02 RX ORDER — PREDNISONE 10 MG/1
TABLET ORAL
Qty: 30 TABLET | Refills: 0 | Status: SHIPPED | OUTPATIENT
Start: 2022-08-02 | End: 2022-12-05

## 2022-08-02 ASSESSMENT — PAIN SCALES - GENERAL: PAINLEVEL: NO PAIN (0)

## 2022-08-02 NOTE — PROGRESS NOTES
"  Assessment & Plan     Dermatitis  For her dermatitis we will switch to prednisone 40 mg daily for 3 days then 30 mg daily for 3 days then 20 mg daily for 3 days then 10 mg daily for 3 days then stop given the widespread nature and lack of improvement with topical steroids.  Also avoid stronger steroid on the face.  Continue with antihistamine such as Claritin during the day and Benadryl at night.    - predniSONE (DELTASONE) 10 MG tablet; 4 tabs daily for 3 days, then 3 tabs daily for 3 days, then 2 tabs daily for 3 days, then 1 tab daily for 3 days, then stop             Nicotine/Tobacco Cessation:  She reports that she has been smoking cigarettes. She has a 0.02 pack-year smoking history. She has never used smokeless tobacco.  Nicotine/Tobacco Cessation Plan:   Information offered: Patient not interested at this time      BMI:   Estimated body mass index is 33.85 kg/m  as calculated from the following:    Height as of 7/27/22: 1.651 m (5' 5\").    Weight as of this encounter: 92.3 kg (203 lb 6.4 oz).   Weight management plan: Discussed healthy diet and exercise guidelines        No follow-ups on file.    Eber Dexter  [facility]    Adelia Hunt is a 32 year old, presenting for the following health issues:  Follow Up (Follow up on rash)    She developed a rash last week and [facility].  The rash initially started around her eyes which caused swelling, puffiness and irritation.  [facility] she was prescribed a topical steroid cream.  She does not feel it has been helpful and in fact her rash seems to have spread including most of her face and onto her arms especially where she has had old scratches.  She is not aware of any new exposures to food, soap, detergent, medications etc.  She has not had a history of rashes like this in the past.  No history of atopy or asthma.    She is also been using some Claritin during the day and Benadryl at night " without any success.    History of Present Illness       Reason for visit:  Rash    She eats 2-3 servings of fruits and vegetables daily.She consumes 0 sweetened beverage(s) daily.She exercises with enough effort to increase her heart rate 30 to 60 minutes per day.  She exercises with enough effort to increase her heart rate 6 days per week.   She is taking medications regularly.             Review of Systems         Objective    BP (!) 162/98 (BP Location: Right arm, Patient Position: Sitting, Cuff Size: Adult Regular)   Pulse 69   Temp 98.5  F (36.9  C) (Tympanic)   Resp 16   Wt 92.3 kg (203 lb 6.4 oz)   LMP 07/31/2022 (Exact Date)   SpO2 100%   BMI 33.85 kg/m    Body mass index is 33.85 kg/m .  Physical Exam   GENERAL: healthy, alert and no distress  SKIN: Widespread erythematous rash noted especially on her face, around her eyelids and under her left hand.  No vesicles.  Her forehead is relatively unremarkable.  Sclera and conjunctiva are normal.  Mucous membranes are normal.                    .  ..

## 2022-09-17 ENCOUNTER — HEALTH MAINTENANCE LETTER (OUTPATIENT)
Age: 32
End: 2022-09-17

## 2022-10-04 ENCOUNTER — OFFICE VISIT (OUTPATIENT)
Dept: FAMILY MEDICINE | Facility: OTHER | Age: 32
End: 2022-10-04
Attending: NURSE PRACTITIONER
Payer: COMMERCIAL

## 2022-10-04 VITALS
OXYGEN SATURATION: 98 % | DIASTOLIC BLOOD PRESSURE: 80 MMHG | TEMPERATURE: 98.7 F | BODY MASS INDEX: 34.16 KG/M2 | SYSTOLIC BLOOD PRESSURE: 144 MMHG | HEIGHT: 65 IN | WEIGHT: 205 LBS | HEART RATE: 76 BPM | RESPIRATION RATE: 20 BRPM

## 2022-10-04 DIAGNOSIS — N30.90 BLADDER INFECTION: Primary | ICD-10-CM

## 2022-10-04 DIAGNOSIS — N30.01 ACUTE CYSTITIS WITH HEMATURIA: ICD-10-CM

## 2022-10-04 LAB
ALBUMIN UR-MCNC: NEGATIVE MG/DL
APPEARANCE UR: ABNORMAL
BACTERIA #/AREA URNS HPF: ABNORMAL /HPF
BILIRUB UR QL STRIP: NEGATIVE
COLOR UR AUTO: ABNORMAL
GLUCOSE UR STRIP-MCNC: NEGATIVE MG/DL
HGB UR QL STRIP: ABNORMAL
KETONES UR STRIP-MCNC: NEGATIVE MG/DL
LEUKOCYTE ESTERASE UR QL STRIP: ABNORMAL
NITRATE UR QL: NEGATIVE
PH UR STRIP: 6.5 [PH] (ref 5–9)
RBC URINE: 5 /HPF
SP GR UR STRIP: 1.01 (ref 1–1.03)
SQUAMOUS EPITHELIAL: 9 /HPF
UROBILINOGEN UR STRIP-MCNC: NORMAL MG/DL
WBC URINE: 11 /HPF

## 2022-10-04 PROCEDURE — 99214 OFFICE O/P EST MOD 30 MIN: CPT | Performed by: PHYSICIAN ASSISTANT

## 2022-10-04 PROCEDURE — 81001 URINALYSIS AUTO W/SCOPE: CPT | Mod: ZL

## 2022-10-04 PROCEDURE — 87086 URINE CULTURE/COLONY COUNT: CPT | Mod: ZL | Performed by: PHYSICIAN ASSISTANT

## 2022-10-04 RX ORDER — NITROFURANTOIN 25; 75 MG/1; MG/1
100 CAPSULE ORAL 2 TIMES DAILY
Qty: 10 CAPSULE | Refills: 0 | Status: SHIPPED | OUTPATIENT
Start: 2022-10-04 | End: 2022-10-09

## 2022-10-04 ASSESSMENT — PAIN SCALES - GENERAL: PAINLEVEL: MILD PAIN (3)

## 2022-10-04 NOTE — PROGRESS NOTES
"ASSESSMENT/PLAN:    I have reviewed the nursing notes.  I have reviewed the findings, diagnosis, plan and need for follow up with the patient.    1. Acute cystitis with hematuria  - nitroFURantoin macrocrystal-monohydrate (MACROBID) 100 MG capsule; Take 1 capsule (100 mg) by mouth 2 times daily for 5 days  Dispense: 10 capsule; Refill: 0  - Vitals stable. PE available for review below. UA results: see below. Based off UA results, patient does meet criteria for antibiotic therapy. I did discuss with patient that if a urine culture was performed, that we will inform patient if a change to their treatment plan needs to occur based off culture results - with urine cultures typically returning in 1-3 days. In the meantime, recommend, alternating tylenol and ibuprofen every 4-6 hours as needed, warm heating pad, pushing fluids/hydration, cranberry juice/pills, urinating when urge arises. May also try over the counter remedies such as Azo (as long as pyridium not already prescribed). Patient aware that if they do take Azo, that this medication can change color of urine to an \"orange\" color. If fevers, chills, flank pain/back pain, inability to urinate/struggle to urinate, signs of dehydration or other worrisome signs occur, patient agreeable to follow up for reevaluation. Patient is in agreement and understanding of the above treatment plan. All questions and concerns were addressed and answered to patient's satisfaction. Patient elected to have AVS placed on [employer], declined formal print out today.     2. Bladder infection  - UA reflex to Microscopic and Culture  - Urine Culture  - UTI noted, will treat with Macrobid and send urine to culture.     Discussed warning signs/symptoms indicative of need to f/u    Follow up if symptoms persist or worsen or concerns    I explained my diagnostic considerations and recommendations to the patient, who voiced understanding and agreement with the treatment plan. All questions were " answered. We discussed potential side effects of any prescribed or recommended therapies, as well as expectations for response to treatments.    Daxa Lyn PA-C  10/4/2022  12:14 PM    HPI:    Marilee Swanson is a 32 year old female  who [facility] for concerns of possible UTI, x 1 week    Symptoms: suprapubic pain and pressure and voiding in small amounts  Flank Pain or Back Pain: No  Blood in Urine: No  Last Urination: [facility]   Able to Completely Urinate/Void: No  Prior UTIs: Yes  Exposures to STIs/STDs: No  Fevers, chills, N/V/D: No  Change in Bowel Habits: No  Vaginal Symptoms or Discharge: No  Recent swimming/use of hot tubs/swimming pools/lakes: No    LMP: last week    Treatments tried: water    Denies CP, SOB, calf tenderness. No new medications. Denies exposure to ill or COVID positive patients.     Allergies: none    Past Medical History:   Diagnosis Date     History of other genital system and obstetric disorders      - NSVDs at term     Past Surgical History:   Procedure Laterality Date     OTHER SURGICAL HISTORY      [id number],NO PREVIOUS SURGERY     Social History     Tobacco Use     Smoking status: Current Every Day Smoker     Packs/day: 0.01     Years: 2.00     Pack years: 0.02     Types: Cigarettes     Last attempt to quit: 2012     Years since quitting: 10.0     Smokeless tobacco: Never Used   Substance Use Topics     Alcohol use: Not Currently     Current Outpatient Medications   Medication Sig Dispense Refill     desonide (DESOWEN) 0.05 % external cream Apply topically 2 times daily 60 g 0     nitroFURantoin macrocrystal-monohydrate (MACROBID) 100 MG capsule Take 1 capsule (100 mg) by mouth 2 times daily for 5 days 10 capsule 0     predniSONE (DELTASONE) 10 MG tablet 4 tabs daily for 3 days, then 3 tabs daily for 3 days, then 2 tabs daily for 3 days, then 1 tab daily for 3 days, then stop (Patient not taking: Reported on 10/4/2022) 30 tablet 0     No  "Known Allergies  Past medical history, past surgical history, current medications and allergies reviewed and accurate to the best of my knowledge.      ROS:  Refer to HPI    BP (!) 144/80 (BP Location: Right arm, Patient Position: Sitting, Cuff Size: Adult Regular)   Pulse 76   Temp 98.7  F (37.1  C) (Tympanic)   Resp 20   Ht 1.651 m (5' 5\")   Wt 93 kg (205 lb)   LMP 08/31/2022   SpO2 98%   Breastfeeding No   BMI 34.11 kg/m      EXAM:  General Appearance: Well appearing 32 year old female, appropriate appearance for age. No acute distress   Respiratory: normal chest wall and respirations.  Normal effort.  Clear to auscultation bilaterally, no wheezing, crackles or rhonchi.  No increased work of breathing.  No cough appreciated.  Cardiac: RRR with no murmurs  Abdomen: soft, nontender, no rigidity, no rebound tenderness or guarding, normal bowel sounds present  :  No suprapubic tenderness to palpation.  Absent CVA tenderness to palpation.    Musculoskeletal:  Equal movement of bilateral upper extremities.  Equal movement of bilateral lower extremities.  Normal gait.    Dermatological: no rashes noted of exposed skin  Psychological: normal affect, alert, oriented, and pleasant.     Labs:  Results for orders placed or performed in visit on 10/04/22   UA reflex to Microscopic and Culture     Status: Abnormal    Specimen: Urine, Clean Catch   Result Value Ref Range    Color Urine Light Yellow Colorless, Straw, Light Yellow, Yellow    Appearance Urine Slightly Cloudy (A) Clear    Glucose Urine Negative Negative mg/dL    Bilirubin Urine Negative Negative    Ketones Urine Negative Negative mg/dL    Specific Gravity Urine 1.007 1.000 - 1.030    Blood Urine Small (A) Negative    pH Urine 6.5 5.0 - 9.0    Protein Albumin Urine Negative Negative mg/dL    Urobilinogen Urine Normal Normal, 2.0 mg/dL    Nitrite Urine Negative Negative    Leukocyte Esterase Urine Moderate (A) Negative    Bacteria Urine Few (A) None Seen " /HPF    RBC Urine 5 (H) <=2 /HPF    WBC Urine 11 (H) <=5 /HPF    Squamous Epithelials Urine 9 (H) <=1 /HPF    Narrative    Urine Culture ordered based on laboratory criteria     Xray:  None

## 2022-10-04 NOTE — NURSING NOTE
Chief Complaint   Patient presents with     Urinary Problem     X 1 week     Neck Problem     Lymph nodes on neck swollen       Advanced Care Planning on file? no    Medication Review Completed: complete    FOOD SECURITY SCREENING QUESTIONS:    The next two questions are to help us understand your food security.  If you are feeling you need any assistance in this area, we have resources available to support you today.    Hunger Vital Signs:  Within the past 12 months we worried whether our food would run out before we got money to buy more. Never  Within the past 12 months the food we bought just didn't last and we didn't have money to get more. Never    Olivia Flores LPN

## 2022-10-04 NOTE — PATIENT INSTRUCTIONS
You were prescribed an antibiotic, please take into consideration the following information:  - Take entire course of antibiotic even if you start to feel better.  - Antibiotics can cause stomach upset including nausea and diarrhea. Read your bottle or ask the pharmacist if antibiotic can be taken with food to help prevent nausea. If you have symptoms of diarrhea you can take an over-the-counter probiotic and/or increase foods with probiotics such as yogurt, Marcelo, sauerkraut.  -Use caution in sunlight as can lead to increased risk of sunburn while on ABX (antibiotics).     Please refer to your AVS for follow up and pain/symptoms management recommendations (I.e.: medications, helpful conservative treatment modalities, appropriate follow up if need to a specialist or family practice, etc.). Please return to urgent care if your symptoms change or worsen.     Discharge instructions:  -Follow up with persistent symptoms with primary care (or ER if severe worsening)  -If you were prescribed a medication(s), please take this as prescribed/directed  -Monitor your symptoms, if changing/worsening, return to UC/ER or PCP for follow up    Urinary Tract Infection (UTI):  -If you were prescribed an antibiotic, please take this as directed and until completion.     -If your urine was sent for a culture, please note this takes on average 1-3 days to return. Urine cultures will show us if there is/if what bacteria grows from your urine. If a change to your treatment plan (antibiotics, etc.) is needed based off your urine culture we will contact you.     -For pain control (if applicable), alternating Tylenol and Ibuprofen is recommended  -Alternate every 4 hours as needed. I.e.: Ibuprofen at 8am, Tylenol 12pm, Ibuprofen 4pm   -Daily maximum of Tylenol is 4000mg (recommend staying under 3000mg)  -Daily maximum of Ibuprofen is 3200 mg    -A warm heating pad may help as well.     -Rest/relaxation and keeping hydrated with clear  liquids (ie: water or cranberry juice - do not do cranberry juice if on Coumadin/Warfarin).     -Empty your bladder when you feel the urge versus holding urine, after urinating you can bear-down to empty bladder completely, after peeing wipe front to back to avoid introducing bacteria towards vaginal area.     -AZO/Pyridium - may take up to 3 times a day, note that this may turn your urine orange    -Avoid sexual intercourse until you have completed your medication.     -Return to ER if any of the following occur: Worsening of symptoms. Fever over 101 F (38.3 C), No improvement by the third day of treatment, Increasing back or abdominal pain, vomiting, unable to keep fluids or medicine down, weakness, dizziness, or fainting, vaginal discharge, pain, redness, or swelling of the vaginal area

## 2022-10-05 LAB — BACTERIA UR CULT: NORMAL

## 2022-12-05 ENCOUNTER — OFFICE VISIT (OUTPATIENT)
Dept: FAMILY MEDICINE | Facility: OTHER | Age: 32
End: 2022-12-05
Attending: NURSE PRACTITIONER
Payer: COMMERCIAL

## 2022-12-05 VITALS
DIASTOLIC BLOOD PRESSURE: 90 MMHG | BODY MASS INDEX: 34.07 KG/M2 | RESPIRATION RATE: 16 BRPM | TEMPERATURE: 98.4 F | SYSTOLIC BLOOD PRESSURE: 152 MMHG | HEART RATE: 76 BPM | WEIGHT: 212 LBS | HEIGHT: 66 IN

## 2022-12-05 DIAGNOSIS — Z00.00 ROUTINE HISTORY AND PHYSICAL EXAMINATION OF ADULT: Primary | ICD-10-CM

## 2022-12-05 DIAGNOSIS — Z12.4 CERVICAL CANCER SCREENING: ICD-10-CM

## 2022-12-05 DIAGNOSIS — R03.0 ELEVATED BLOOD PRESSURE READING WITHOUT DIAGNOSIS OF HYPERTENSION: ICD-10-CM

## 2022-12-05 DIAGNOSIS — R31.21 ASYMPTOMATIC MICROSCOPIC HEMATURIA: ICD-10-CM

## 2022-12-05 DIAGNOSIS — R53.83 OTHER FATIGUE: ICD-10-CM

## 2022-12-05 DIAGNOSIS — N92.0 MENORRHAGIA WITH REGULAR CYCLE: ICD-10-CM

## 2022-12-05 LAB
ALBUMIN UR-MCNC: NEGATIVE MG/DL
APPEARANCE UR: CLEAR
BASOPHILS # BLD AUTO: 0.1 10E3/UL (ref 0–0.2)
BASOPHILS NFR BLD AUTO: 1 %
BILIRUB UR QL STRIP: NEGATIVE
COLOR UR AUTO: YELLOW
EOSINOPHIL # BLD AUTO: 0.4 10E3/UL (ref 0–0.7)
EOSINOPHIL NFR BLD AUTO: 6 %
ERYTHROCYTE [DISTWIDTH] IN BLOOD BY AUTOMATED COUNT: 12.4 % (ref 10–15)
FERRITIN SERPL-MCNC: 87 NG/ML (ref 6–175)
GLUCOSE UR STRIP-MCNC: NEGATIVE MG/DL
HCT VFR BLD AUTO: 40.4 % (ref 35–47)
HGB BLD-MCNC: 13.9 G/DL (ref 11.7–15.7)
HGB UR QL STRIP: ABNORMAL
IMM GRANULOCYTES # BLD: 0 10E3/UL
IMM GRANULOCYTES NFR BLD: 0 %
IRON BINDING CAPACITY (ROCHE): 286 UG/DL (ref 240–430)
IRON SATN MFR SERPL: 15 % (ref 15–46)
IRON SERPL-MCNC: 44 UG/DL (ref 37–145)
KETONES UR STRIP-MCNC: NEGATIVE MG/DL
LEUKOCYTE ESTERASE UR QL STRIP: NEGATIVE
LYMPHOCYTES # BLD AUTO: 3.3 10E3/UL (ref 0.8–5.3)
LYMPHOCYTES NFR BLD AUTO: 42 %
MCH RBC QN AUTO: 31 PG (ref 26.5–33)
MCHC RBC AUTO-ENTMCNC: 34.4 G/DL (ref 31.5–36.5)
MCV RBC AUTO: 90 FL (ref 78–100)
MONOCYTES # BLD AUTO: 0.4 10E3/UL (ref 0–1.3)
MONOCYTES NFR BLD AUTO: 6 %
NEUTROPHILS # BLD AUTO: 3.6 10E3/UL (ref 1.6–8.3)
NEUTROPHILS NFR BLD AUTO: 45 %
NITRATE UR QL: NEGATIVE
NRBC # BLD AUTO: 0 10E3/UL
NRBC BLD AUTO-RTO: 0 /100
PH UR STRIP: 6.5 [PH] (ref 5–9)
PLATELET # BLD AUTO: 243 10E3/UL (ref 150–450)
RBC # BLD AUTO: 4.48 10E6/UL (ref 3.8–5.2)
RBC URINE: 15 /HPF
SP GR UR STRIP: 1.01 (ref 1–1.03)
UROBILINOGEN UR STRIP-MCNC: NORMAL MG/DL
WBC # BLD AUTO: 7.8 10E3/UL (ref 4–11)
WBC URINE: <1 /HPF

## 2022-12-05 PROCEDURE — 82728 ASSAY OF FERRITIN: CPT | Mod: ZL | Performed by: NURSE PRACTITIONER

## 2022-12-05 PROCEDURE — G0123 SCREEN CERV/VAG THIN LAYER: HCPCS | Performed by: NURSE PRACTITIONER

## 2022-12-05 PROCEDURE — 87624 HPV HI-RISK TYP POOLED RSLT: CPT | Mod: ZL | Performed by: NURSE PRACTITIONER

## 2022-12-05 PROCEDURE — 83550 IRON BINDING TEST: CPT | Mod: ZL | Performed by: NURSE PRACTITIONER

## 2022-12-05 PROCEDURE — 85025 COMPLETE CBC W/AUTO DIFF WBC: CPT | Mod: ZL | Performed by: NURSE PRACTITIONER

## 2022-12-05 PROCEDURE — 81001 URINALYSIS AUTO W/SCOPE: CPT | Mod: ZL | Performed by: NURSE PRACTITIONER

## 2022-12-05 PROCEDURE — 99395 PREV VISIT EST AGE 18-39: CPT | Performed by: NURSE PRACTITIONER

## 2022-12-05 PROCEDURE — 36415 COLL VENOUS BLD VENIPUNCTURE: CPT | Mod: ZL | Performed by: NURSE PRACTITIONER

## 2022-12-05 PROCEDURE — 82306 VITAMIN D 25 HYDROXY: CPT | Mod: ZL | Performed by: NURSE PRACTITIONER

## 2022-12-05 ASSESSMENT — ENCOUNTER SYMPTOMS
FREQUENCY: 1
DIARRHEA: 0
HEADACHES: 0
EYE PAIN: 0
NAUSEA: 0
HEMATOCHEZIA: 0
BREAST MASS: 0
ABDOMINAL PAIN: 0
SHORTNESS OF BREATH: 0
PALPITATIONS: 0
DIZZINESS: 0
MYALGIAS: 0
WEAKNESS: 0
DYSURIA: 0
FEVER: 0
ARTHRALGIAS: 0
CONSTIPATION: 0
PARESTHESIAS: 0
HEMATURIA: 1
HEARTBURN: 0
JOINT SWELLING: 0
COUGH: 0
CHILLS: 0
NERVOUS/ANXIOUS: 0
SORE THROAT: 0

## 2022-12-05 ASSESSMENT — PATIENT HEALTH QUESTIONNAIRE - PHQ9
SUM OF ALL RESPONSES TO PHQ QUESTIONS 1-9: 4
10. IF YOU CHECKED OFF ANY PROBLEMS, HOW DIFFICULT HAVE THESE PROBLEMS MADE IT FOR YOU TO DO YOUR WORK, TAKE CARE OF THINGS AT HOME, OR GET ALONG WITH OTHER PEOPLE: NOT DIFFICULT AT ALL
SUM OF ALL RESPONSES TO PHQ QUESTIONS 1-9: 4

## 2022-12-05 NOTE — NURSING NOTE
Patient presents today for annual physical and pap.    Medication Reconciliation Complete    Grace Hanley LPN  12/5/2022 8:54 AM

## 2022-12-05 NOTE — PROGRESS NOTES
SUBJECTIVE:   CC: Marilee is an 32 year old who presents for preventive health visit.   Patient has been advised of split billing requirements and indicates understanding: Yes  Healthy Habits:     Getting at least 3 servings of Calcium per day:  Yes    Bi-annual eye exam:  NO    Dental care twice a year:  NO    Sleep apnea or symptoms of sleep apnea:  None    Diet:  Regular (no restrictions)    Frequency of exercise:  2-3 days/week    Duration of exercise:  30-45 minutes    Taking medications regularly:  Yes    Medication side effects:  None    PHQ-2 Total Score: 0    Additional concerns today:  Yes    She has noted blood in her urine with recent infections.  Occasionally will have visible blood even without signs of infection.  She has concerns about this and would like to have further evaluation.  She also has been noting increased fatigue despite normal sleep.    Today's PHQ-2 Score:   PHQ-2 ( 1999 [employer]) 12/5/2022   Q1: Little interest or pleasure in doing things 0   Q2: Feeling down, depressed or hopeless 0   PHQ-2 Score 0   PHQ-2 Total Score (12-17 Years)- Positive if 3 or more points; Administer PHQ-A if positive -   Q1: Little interest or pleasure in doing things Not at all   Q2: Feeling down, depressed or hopeless Not at all   PHQ-2 Score 0       Have you ever done Advance Care Planning? (For example, a Health Directive, POLST, or a discussion with a medical provider or your loved ones about your wishes):     Social History     Tobacco Use     Smoking status: Every Day     Packs/day: 0.01     Years: 2.00     Pack years: 0.02     Types: Cigarettes     Last attempt to quit: 9/1/2012     Years since quitting: 10.2     Smokeless tobacco: Never   Substance Use Topics     Alcohol use: Not Currently         Alcohol Use 12/5/2022   Prescreen: >3 drinks/day or >7 drinks/week? No       Reviewed orders with patient.  Reviewed health maintenance and updated orders accordingly - Yes  BP Readings from Last 3  Encounters:   12/05/22 (!) 152/90   10/04/22 (!) 144/80   08/02/22 (!) 162/98    Wt Readings from Last 3 Encounters:   12/05/22 96.2 kg (212 lb)   10/04/22 93 kg (205 lb)   08/02/22 92.3 kg (203 lb 6.4 oz)                  There is no problem list on file for this patient.    Past Surgical History:   Procedure Laterality Date     OTHER SURGICAL HISTORY      [id number],NO PREVIOUS SURGERY       Social History     Tobacco Use     Smoking status: Every Day     Packs/day: 0.01     Years: 2.00     Pack years: 0.02     Types: Cigarettes     Last attempt to quit: 9/1/2012     Years since quitting: 10.2     Smokeless tobacco: Never   Substance Use Topics     Alcohol use: Not Currently     Family History   Problem Relation Age of Onset     Other - See Comments Mother         renal disease.     No Known Problems Father      Thyroid Disease Sister      Skin Cancer Sister      Heart Disease Maternal Grandfather         Heart Disease,Heart valve surgery     Cervical Cancer Paternal Grandmother      Lung Cancer Paternal Grandmother      Seizure Disorder Son         Seizures,?febrile sz     Family History Negative Son         Good Health     Other - See Comments Son         Congenital hemangioma         Current Outpatient Medications   Medication Sig Dispense Refill     desonide (DESOWEN) 0.05 % external cream Apply topically 2 times daily 60 g 0     No Known Allergies    Breast Cancer Screening:  Any new diagnosis of family breast, ovarian, or bowel cancer? No    FHS-7: No flowsheet data found.    Patient under 40 years of age: Routine Mammogram Screening not recommended.   Pertinent mammograms are reviewed under the imaging tab.    History of abnormal Pap smear: NO - age 30-65 PAP every 5 years with negative HPV co-testing recommended     Reviewed and updated as needed this visit by clinical staff   Tobacco  Allergies  Meds  Problems  Med Hx  Surg Hx  Fam Hx          Reviewed and updated as needed this visit by Provider    "Tobacco  Allergies  Meds  Problems  Med Hx  Surg Hx  Fam Hx         Past Medical History:   Diagnosis Date     History of other genital system and obstetric disorders      - NSVDs at term      Past Surgical History:   Procedure Laterality Date     OTHER SURGICAL HISTORY      [id number],NO PREVIOUS SURGERY       Review of Systems   Constitutional: Negative for chills and fever.   HENT: Negative for congestion, ear pain, hearing loss and sore throat.    Eyes: Negative for pain and visual disturbance.   Respiratory: Negative for cough and shortness of breath.    Cardiovascular: Negative for chest pain, palpitations and peripheral edema.   Gastrointestinal: Negative for abdominal pain, constipation, diarrhea, heartburn, hematochezia and nausea.   Breasts:  Negative for tenderness, breast mass and discharge.   Genitourinary: Positive for frequency, hematuria and pelvic pain. Negative for dysuria, genital sores, urgency, vaginal bleeding and vaginal discharge.   Musculoskeletal: Negative for arthralgias, joint swelling and myalgias.   Skin: Negative for rash.   Neurological: Negative for dizziness, weakness, headaches and paresthesias.   Psychiatric/Behavioral: Negative for mood changes. Decreased concentration: cbc. The patient is not nervous/anxious.           OBJECTIVE:   BP (!) 152/90   Pulse 76   Temp 98.4  F (36.9  C)   Resp 16   Ht 1.664 m (5' 5.5\")   Wt 96.2 kg (212 lb)   LMP 2022   BMI 34.74 kg/m    Physical Exam  GENERAL: healthy, alert and no distress  EYES: Eyes grossly normal to inspection, PERRL and conjunctivae and sclerae normal  HENT: ear canals and TM's normal, nose and mouth without ulcers or lesions  NECK: no adenopathy, no asymmetry, masses, or scars and thyroid normal to palpation  RESP: lungs clear to auscultation - no rales, rhonchi or wheezes  BREAST: normal without masses, tenderness or nipple discharge and no palpable axillary masses or adenopathy  CV: regular rate and " rhythm, normal S1 S2, no S3 or S4, no murmur, click or rub, no peripheral edema and peripheral pulses strong  ABDOMEN: soft, nontender, no hepatosplenomegaly, no masses and bowel sounds normal   (female): normal female external genitalia, normal urethral meatus, vaginal mucosa pink, moist, well rugated, and normal cervix/adnexa/uterus without masses or discharge.  Pap smear with HPV obtained.  MS: no gross musculoskeletal defects noted, no edema  SKIN: no suspicious lesions or rashes  NEURO: Normal strength and tone, mentation intact and speech normal  PSYCH: mentation appears normal, affect normal/bright    Diagnostic Test Results:  Labs reviewed in Epic  Results for orders placed or performed in visit on 12/05/22   UA reflex to Microscopic     Status: Abnormal   Result Value Ref Range    Color Urine Yellow Colorless, Straw, Light Yellow, Yellow    Appearance Urine Clear Clear    Glucose Urine Negative Negative mg/dL    Bilirubin Urine Negative Negative    Ketones Urine Negative Negative mg/dL    Specific Gravity Urine 1.015 1.000 - 1.030    Blood Urine Moderate (A) Negative    pH Urine 6.5 5.0 - 9.0    Protein Albumin Urine Negative Negative mg/dL    Urobilinogen Urine Normal Normal, 2.0 mg/dL    Nitrite Urine Negative Negative    Leukocyte Esterase Urine Negative Negative    RBC Urine 15 (H) <=2 /HPF    WBC Urine <1 <=5 /HPF   Vitamin D Total     Status: Normal   Result Value Ref Range    Vitamin D, Total (25-Hydroxy) 21 20 - 75 ug/L    Narrative    Season, race, dietary intake, and treatment affect the concentration of 25-hydroxy-Vitamin D. Values may decrease during winter months and increase during summer months. Values 20-29 ug/L may indicate Vitamin D insufficiency and values <20 ug/L may indicate Vitamin D deficiency.    Vitamin D determination is routinely performed by an immunoassay specific for 25 hydroxyvitamin D3.  If an individual is on vitamin D2(ergocalciferol) supplementation, please specify 25  OH vitamin D2 and D3 level determination by LCMSMS test VITD23.     Ferritin     Status: Normal   Result Value Ref Range    Ferritin 87 6 - 175 ng/mL   Iron & Iron Binding Capacity     Status: Normal   Result Value Ref Range    Iron 44 37 - 145 ug/dL    Iron Sat Index 15 15 - 46 %    Iron Binding Capacity 286 240 - 430 ug/dL   CBC with platelets and differential     Status: None   Result Value Ref Range    WBC Count 7.8 4.0 - 11.0 10e3/uL    RBC Count 4.48 3.80 - 5.20 10e6/uL    Hemoglobin 13.9 11.7 - 15.7 g/dL    Hematocrit 40.4 35.0 - 47.0 %    MCV 90 78 - 100 fL    MCH 31.0 26.5 - 33.0 pg    MCHC 34.4 31.5 - 36.5 g/dL    RDW 12.4 10.0 - 15.0 %    Platelet Count 243 150 - 450 10e3/uL    % Neutrophils 45 %    % Lymphocytes 42 %    % Monocytes 6 %    % Eosinophils 6 %    % Basophils 1 %    % Immature Granulocytes 0 %    NRBCs per 100 WBC 0 <1 /100    Absolute Neutrophils 3.6 1.6 - 8.3 10e3/uL    Absolute Lymphocytes 3.3 0.8 - 5.3 10e3/uL    Absolute Monocytes 0.4 0.0 - 1.3 10e3/uL    Absolute Eosinophils 0.4 0.0 - 0.7 10e3/uL    Absolute Basophils 0.1 0.0 - 0.2 10e3/uL    Absolute Immature Granulocytes 0.0 <=0.4 10e3/uL    Absolute NRBCs 0.0 10e3/uL   CBC and Differential     Status: None    Narrative    The following orders were created for panel order CBC and Differential.  Procedure                               Abnormality         Status                     ---------                               -----------         ------                     CBC with platelets and d...[[id number]]                      Final result                 Please view results for these tests on the individual orders.       ASSESSMENT/PLAN:       ICD-10-CM    1. Routine history and physical examination of adult  Z00.00       2. Cervical cancer screening  Z12.4 Pap Screen with HPV - recommended age 30 - 65 years     HPV High Risk Types DNA Cervical      3. Asymptomatic microscopic hematuria  R31.21 UA reflex to Microscopic     UA reflex to  Microscopic      4. Other fatigue  R53.83 Vitamin D Total     Vitamin D Total      5. Menorrhagia with regular cycle  N92.0 CBC and Differential     Ferritin     Iron & Iron Binding Capacity     CBC and Differential     Ferritin     Iron & Iron Binding Capacity      6. Elevated blood pressure reading without diagnosis of hypertension  R03.0         Vitamin D is low, recommend oral vitamin D supplementation.  UA has moderate blood, 15 RBCs.  Consider urology consult.  Remainder of labs are stable.  Pap with HPV obtained and pending.  Blood pressure is elevated, recommend home blood pressure monitoring and follow-up if this remains elevated.  She does report history of whitecoat syndrome.  Patient has been advised of split billing requirements and indicates understanding: Yes      COUNSELING:  Reviewed preventive health counseling, as reflected in patient instructions       Regular exercise       Healthy diet/nutrition       Vision screening        She reports that she has been smoking cigarettes. She has a 0.02 pack-year smoking history. She has never used smokeless tobacco.        KEMAR Briones [facility] AND [facility]

## 2022-12-06 LAB — DEPRECATED CALCIDIOL+CALCIFEROL SERPL-MC: 21 UG/L (ref 20–75)

## 2022-12-08 LAB
BKR LAB AP GYN ADEQUACY: NORMAL
BKR LAB AP GYN INTERPRETATION: NORMAL
BKR LAB AP HPV REFLEX: NORMAL
BKR LAB AP LMP: NORMAL
BKR LAB AP PREVIOUS ABNORMAL: NORMAL
PATH REPORT.COMMENTS IMP SPEC: NORMAL
PATH REPORT.COMMENTS IMP SPEC: NORMAL
PATH REPORT.RELEVANT HX SPEC: NORMAL

## 2022-12-11 LAB
HUMAN PAPILLOMA VIRUS 16 DNA: NEGATIVE
HUMAN PAPILLOMA VIRUS 18 DNA: NEGATIVE
HUMAN PAPILLOMA VIRUS FINAL DIAGNOSIS: NORMAL
HUMAN PAPILLOMA VIRUS OTHER HR: NEGATIVE

## 2023-03-03 ENCOUNTER — OFFICE VISIT (OUTPATIENT)
Dept: FAMILY MEDICINE | Facility: OTHER | Age: 33
End: 2023-03-03
Attending: NURSE PRACTITIONER
Payer: COMMERCIAL

## 2023-03-03 VITALS
BODY MASS INDEX: 35.66 KG/M2 | TEMPERATURE: 97.7 F | SYSTOLIC BLOOD PRESSURE: 148 MMHG | HEART RATE: 87 BPM | RESPIRATION RATE: 16 BRPM | OXYGEN SATURATION: 99 % | DIASTOLIC BLOOD PRESSURE: 96 MMHG | WEIGHT: 217.6 LBS

## 2023-03-03 DIAGNOSIS — I10 BENIGN ESSENTIAL HYPERTENSION: Primary | ICD-10-CM

## 2023-03-03 PROCEDURE — 99213 OFFICE O/P EST LOW 20 MIN: CPT | Performed by: NURSE PRACTITIONER

## 2023-03-03 RX ORDER — AMLODIPINE BESYLATE 5 MG/1
5 TABLET ORAL DAILY
Qty: 30 TABLET | Refills: 1 | Status: SHIPPED | OUTPATIENT
Start: 2023-03-03 | End: 2023-03-17

## 2023-03-03 ASSESSMENT — PATIENT HEALTH QUESTIONNAIRE - PHQ9
10. IF YOU CHECKED OFF ANY PROBLEMS, HOW DIFFICULT HAVE THESE PROBLEMS MADE IT FOR YOU TO DO YOUR WORK, TAKE CARE OF THINGS AT HOME, OR GET ALONG WITH OTHER PEOPLE: NOT DIFFICULT AT ALL
SUM OF ALL RESPONSES TO PHQ QUESTIONS 1-9: 2
SUM OF ALL RESPONSES TO PHQ QUESTIONS 1-9: 2

## 2023-03-03 ASSESSMENT — PAIN SCALES - GENERAL: PAINLEVEL: NO PAIN (0)

## 2023-03-03 NOTE — NURSING NOTE
"Chief Complaint   Patient presents with     RECHECK     Elevated BP   She stated her BP has been elevated. On February 15 th she had a headache and lost the sight out of her left eye. She then checked her BP and it was per her 180/160. She checked it off and on since then and it has been elevated.  Ronel Hardy LPN..................3/3/2023   10:11 AM      Initial BP (!) 156/106   Pulse 87   Temp 97.7  F (36.5  C) (Tympanic)   Resp 16   Wt 98.7 kg (217 lb 9.6 oz)   LMP 02/10/2023   SpO2 99%   Breastfeeding No   BMI 35.66 kg/m   Estimated body mass index is 35.66 kg/m  as calculated from the following:    Height as of 12/5/22: 1.664 m (5' 5.5\").    Weight as of this encounter: 98.7 kg (217 lb 9.6 oz).  Medication Reconciliation: complete    FOOD SECURITY SCREENING QUESTIONS  Hunger Vital Signs:  Within the past 12 months we worried whether our food would run out before we got money to buy more. Never  Within the past 12 months the food we bought just didn't last and we didn't have money to get more. Never        Advance care directive on file? no  Advance care directive provided to patient? declined     Ronel Hardy LPN  "

## 2023-03-03 NOTE — PROGRESS NOTES
Assessment & Plan   Problem List Items Addressed This Visit    None  Visit Diagnoses     Benign essential hypertension    -  Primary    Relevant Medications    amLODIPine (NORVASC) 5 MG tablet      Hypertension, plan to treat with amlodipine 5 mg daily.  I like to see her back in 1 to 2 weeks for recheck of blood pressure, will adjust medications accordingly.  Discussed lifestyle modifications.      Prescription drug management  20 minutes spent on the date of the encounter doing chart review, history and exam, documentation and further activities per the note       No follow-ups on file.    KEMAR Briones [facility] AND [facility]    Adelia Hunt is a 33 year old, presenting for the following health issues:  RECHECK (Elevated BP)      History of Present Illness       Hypertension: She presents for follow up of hypertension.  She does check blood pressure  regularly outside of the clinic. Outside blood pressures have been over 140/90. She does not follow a low salt diet.     She eats 0-1 servings of fruits and vegetables daily.She consumes 0 sweetened beverage(s) daily.She exercises with enough effort to increase her heart rate 30 to 60 minutes per day.  She exercises with enough effort to increase her heart rate 4 days per week.   She is taking medications regularly.    Today's PHQ-9         PHQ-9 Total Score: 2    PHQ-9 Q9 Thoughts of better off dead/self-harm past 2 weeks :   Not at all    How difficult have these problems made it for you to do your work, take care of things at home, or get along with other people: Not difficult at all     She comes in today for evaluation of hypertension.  She has a history of elevated blood pressures.  At her last visit this was elevated and she was going to monitor this at home and follow-up as needed.  History of being on medications including lisinopril.  Recently she had significantly elevated blood pressure, 180/100.  She had spots and changes in  her vision as well as headaches.  She is continue to monitor this with excessively elevated blood pressures.  She reports when she has caffeine or salt this raises her blood pressure as well.    Review of Systems   See above      Objective    BP (!) 148/96   Pulse 87   Temp 97.7  F (36.5  C) (Tympanic)   Resp 16   Wt 98.7 kg (217 lb 9.6 oz)   LMP 02/10/2023   SpO2 99%   Breastfeeding No   BMI 35.66 kg/m    Body mass index is 35.66 kg/m .  Physical Exam   GENERAL: healthy, alert and no distress  EYES: Eyes grossly normal to inspection  RESP: lungs clear to auscultation - no rales, rhonchi or wheezes  CV: regular rate and rhythm, normal S1 S2  NEURO: Normal strength and tone, mentation intact and speech normal  PSYCH: mentation appears normal, affect normal/bright

## 2023-03-17 ENCOUNTER — OFFICE VISIT (OUTPATIENT)
Dept: FAMILY MEDICINE | Facility: OTHER | Age: 33
End: 2023-03-17
Attending: NURSE PRACTITIONER
Payer: COMMERCIAL

## 2023-03-17 VITALS
TEMPERATURE: 98.5 F | DIASTOLIC BLOOD PRESSURE: 98 MMHG | HEART RATE: 89 BPM | RESPIRATION RATE: 20 BRPM | OXYGEN SATURATION: 97 % | BODY MASS INDEX: 35 KG/M2 | SYSTOLIC BLOOD PRESSURE: 154 MMHG | WEIGHT: 213.6 LBS

## 2023-03-17 DIAGNOSIS — I10 BENIGN ESSENTIAL HYPERTENSION: Primary | ICD-10-CM

## 2023-03-17 PROCEDURE — 99213 OFFICE O/P EST LOW 20 MIN: CPT | Performed by: NURSE PRACTITIONER

## 2023-03-17 RX ORDER — VALSARTAN 40 MG/1
40 TABLET ORAL DAILY
Qty: 30 TABLET | Refills: 0 | Status: SHIPPED | OUTPATIENT
Start: 2023-03-17 | End: 2023-06-30

## 2023-03-17 ASSESSMENT — PAIN SCALES - GENERAL: PAINLEVEL: NO PAIN (0)

## 2023-03-17 NOTE — PROGRESS NOTES
Assessment & Plan   Problem List Items Addressed This Visit    None  Visit Diagnoses     Benign essential hypertension    -  Primary    Relevant Medications    valsartan (DIOVAN) 40 MG tablet         Blood pressure continues to be elevated both at home and in clinic.  She is having side effects of the amlodipine per her report.  At this time we will have her discontinue this and start valsartan 40 mg daily.  We did review side effects to medication.  She will send me a [employer] message next week with home blood pressure readings as well as how she is feeling with this current medication.  We will discuss adjustments at that time.    Prescription drug management  23 minutes spent on the date of the encounter doing chart review, history and exam, documentation and further activities per the note       No follow-ups on file.    KEMAR Briones [facility] AND [facility]    Adelia Hunt is a 33 year old, presenting for the following health issues:  Hypertension      History of Present Illness       Hypertension: She presents for follow up of hypertension.  She does check blood pressure  regularly outside of the clinic. Outside blood pressures have been over 140/90. She does not follow a low salt diet.     She eats 4 or more servings of fruits and vegetables daily.She consumes 0 sweetened beverage(s) daily.She exercises with enough effort to increase her heart rate 30 to 60 minutes per day.  She exercises with enough effort to increase her heart rate 4 days per week.   She is taking medications regularly.     She comes in today for follow-up on hypertension.  She was seen a couple weeks ago and started on amlodipine 5 mg daily.  Orts that when she exercises that she has a racing heart and feels dizzy and feels if she is going to pass out.  Medication also has caused her to feel fatigued so she has been taking this at night.  Home blood pressure readings have been primarily in the 140s 150s over  90s to 100s.  She has a history of being on lisinopril, this caused fatigue and she had decreased this down to 5 mg before she stopped the medication.  No other medications have been used in the past      Review of Systems   See above      Objective    BP (!) 154/98   Pulse 89   Temp 98.5  F (36.9  C)   Resp 20   Wt 96.9 kg (213 lb 9.6 oz)   LMP 03/10/2023   SpO2 97%   BMI 35.00 kg/m    Body mass index is 35 kg/m .  Physical Exam   GENERAL: healthy, alert and no distress  EYES: Eyes grossly normal to inspection  NEURO: Normal strength and tone, mentation intact and speech normal  PSYCH: mentation appears normal, affect normal/bright

## 2023-03-17 NOTE — NURSING NOTE
Patient here for blood pressure follow up. The medication makes her tired she takes it at night now. Medication Reconciliation: complete.    Roxie Ramachandran LPN  3/17/2023 10:07 AM

## 2023-04-13 ENCOUNTER — OFFICE VISIT (OUTPATIENT)
Dept: FAMILY MEDICINE | Facility: OTHER | Age: 33
End: 2023-04-13
Payer: COMMERCIAL

## 2023-04-13 VITALS
HEART RATE: 62 BPM | OXYGEN SATURATION: 98 % | RESPIRATION RATE: 17 BRPM | SYSTOLIC BLOOD PRESSURE: 148 MMHG | BODY MASS INDEX: 34.74 KG/M2 | DIASTOLIC BLOOD PRESSURE: 98 MMHG | TEMPERATURE: 97.1 F | WEIGHT: 212 LBS

## 2023-04-13 DIAGNOSIS — J02.0 STREP PHARYNGITIS: Primary | ICD-10-CM

## 2023-04-13 LAB — GROUP A STREP BY PCR: DETECTED

## 2023-04-13 PROCEDURE — 99213 OFFICE O/P EST LOW 20 MIN: CPT

## 2023-04-13 PROCEDURE — 87651 STREP A DNA AMP PROBE: CPT | Mod: ZL

## 2023-04-13 RX ORDER — PENICILLIN V POTASSIUM 500 MG/1
500 TABLET, FILM COATED ORAL 2 TIMES DAILY
Qty: 20 TABLET | Refills: 0 | Status: SHIPPED | OUTPATIENT
Start: 2023-04-13 | End: 2023-04-23

## 2023-04-13 ASSESSMENT — PAIN SCALES - GENERAL: PAINLEVEL: MODERATE PAIN (5)

## 2023-04-13 NOTE — NURSING NOTE
Pt presents to clinic today for possible strep throat. Patient reports kids had strep over the weekend and kids were unknowingly sharing cups with patient.       FOOD SECURITY SCREENING QUESTIONS:    The next two questions are to help us understand your food security.  If you are feeling you need any assistance in this area, we have resources available to support you today.    Hunger Vital Signs:  Within the past 12 months we worried whether our food would run out before we got money to buy more. Never  Within the past 12 months the food we bought just didn't last and we didn't have money to get more. Never      Medication Reconciliation: complete  Sandra Herrera LPN,LPN on 4/13/2023 at 1:28 PM     
Depression

## 2023-04-13 NOTE — PROGRESS NOTES
ASSESSMENT/PLAN:    (J02.0) Strep pharyngitis  (primary encounter diagnosis)  Comment: Patient with a 2-day history of sore throat without cough or fever.  Her children have strep and she has been sharing drinks with them.  Vital signs stable other than elevated blood pressure.  She plans to follow-up with PCP with blood pressure management.  On exam posterior pharynx with erythema with exudates.  Strep test positive.  Plan: Group A Streptococcus PCR Throat Swab,         penicillin V (VEETID) 500 MG tablet  Recommend taking entire course of antibiotic even if feeling better prior to this. You may take a daily probiotic while on this medication.  Recommend changing toothbrush on day 2.    You will be contagious for 24 hour after starting antibiotic.   Recommend alternating Tylenol and ibuprofen every 4-6 hours as needed.  Also recommend salt water gargles, humidifier, throat lozenges if old enough not to be a choking hazard, warm honey if greater than 12 months in age, other home remedies as needed.   If changing or worsening symptoms such as: Worsening fevers, pain, inability to handle own secretions, etc., recommend follow-up.     Discussed warning signs/symptoms indicative of need to f/u    Follow up if symptoms persist or worsen or concerns    I have reviewed the nursing notes.  I have reviewed the findings, diagnosis, plan and need for follow up with the patient.    I explained my diagnostic considerations and recommendations to the patient, who voiced understanding and agreement with the treatment plan. All questions were answered. We discussed potential side effects of any prescribed or recommended therapies, as well as expectations for response to treatments.    KEMAR CAMPOVERDE CNP  4/13/2023  1:34 PM    HPI:    Marilee Swanson is a 33 year old female  who presents to [facility] today for concerns of strep throat.    Symptoms started 2 days ago. She has a sore throat and white patches on her  pharynx with body aches as well. No cough or rhinorrhea. Decreased appetite. No new rashes. No N/V. Her kids have strep currently and she would like to rule out strep.     No known medication allergies.    PCP: JADYN Shah.     Past Medical History:   Diagnosis Date     History of other genital system and obstetric disorders      - NSVDs at term     Past Surgical History:   Procedure Laterality Date     OTHER SURGICAL HISTORY      [id number],NO PREVIOUS SURGERY     Social History     Tobacco Use     Smoking status: Every Day     Packs/day: 0.01     Years: 2.00     Pack years: 0.02     Types: Cigarettes     Last attempt to quit: 2012     Years since quitting: 10.6     Smokeless tobacco: Never   Vaping Use     Vaping status: Never Used   Substance Use Topics     Alcohol use: Not Currently     Current Outpatient Medications   Medication Sig Dispense Refill     penicillin V (VEETID) 500 MG tablet Take 1 tablet (500 mg) by mouth 2 times daily for 10 days 20 tablet 0     desonide (DESOWEN) 0.05 % external cream Apply topically 2 times daily (Patient not taking: Reported on 2023) 60 g 0     valsartan (DIOVAN) 40 MG tablet Take 1 tablet (40 mg) by mouth daily (Patient not taking: Reported on 2023) 30 tablet 0     No Known Allergies  Past medical history, past surgical history, current medications and allergies reviewed and accurate to the best of my knowledge.      ROS:  Refer to HPI    BP (!) 148/98 (BP Location: Right arm)   Pulse 62   Temp 97.1  F (36.2  C) (Tympanic)   Resp 17   Wt 96.2 kg (212 lb)   LMP 2023 (Exact Date)   SpO2 98%   BMI 34.74 kg/m      EXAM:  General Appearance: Well appearing 33 year old female, appropriate appearance for age. No acute distress   Ears: Left TM intact, translucent with bony landmarks appreciated, no erythema, no effusion, no bulging, no purulence.  Right TM intact, translucent with bony landmarks appreciated, no erythema, no effusion, no bulging, no  purulence.  Left auditory canal clear.  Right auditory canal clear.  Normal external ears, non tender.  Eyes: conjunctivae normal without erythema or irritation, corneas clear, no drainage or crusting, no eyelid swelling, pupils equal   Oropharynx: moist mucous membranes, posterior pharynx with erythema, with exudates, no petechiae, no post nasal drip seen, no trismus, voice clear.    Nose:  Bilateral nares: no erythema, no edema, no drainage or congestion   Neck: supple without adenopathy  Respiratory: normal chest wall and respirations.  Normal effort.  Clear to auscultation bilaterally, no wheezing, crackles or rhonchi.  No increased work of breathing.  No cough appreciated.  Cardiac: RRR with no murmurs  Abdomen: soft, nontender, no rigidity, no rebound tenderness or guarding, normal bowel sounds present  Musculoskeletal:  Equal movement of bilateral upper extremities.  Equal movement of bilateral lower extremities.  Normal gait.    Dermatological: no rashes noted of exposed skin  Neuro: Alert and oriented to person, place, and time.  Cranial nerves II-XII grossly intact with no focal or lateralizing deficits.  Muscle tone normal.  Gait normal. No tremor.   Psychological: normal affect, alert, oriented, and pleasant.     Labs:  Results for orders placed or performed in visit on 04/13/23   Group A Streptococcus PCR Throat Swab     Status: Abnormal    Specimen: Throat; Swab   Result Value Ref Range    Group A strep by PCR Detected (A) Not Detected    Narrative    The Xpert Xpress Strep A test, performed on the [employer], is a rapid, qualitative in vitro diagnostic test for the detection of Streptococcus pyogenes (Group A ß-hemolytic Streptococcus, Strep A) in throat swab specimens from patients with signs and symptoms of pharyngitis. The Xpert Xpress Strep A test can be used as an aid in the diagnosis of Group A Streptococcal pharyngitis. The assay is not intended to monitor treatment for Group  A Streptococcus infections. The Xpert Xpress Strep A test utilizes an automated real-time polymerase chain reaction (PCR) to detect Streptococcus pyogenes DNA.

## 2023-04-13 NOTE — PATIENT INSTRUCTIONS
If strep is positive:    Recommend taking entire course of antibiotic even if feeling better prior to this. You may take a daily probiotic while on this medication.  Recommend changing toothbrush on day 2.    You will be contagious for 24 hour after starting antibiotic.   Recommend alternating Tylenol and ibuprofen every 4-6 hours as needed.  Also recommend salt water gargles, humidifier, throat lozenges if old enough not to be a choking hazard, warm honey if greater than 12 months in age, other home remedies as needed.   If changing or worsening symptoms such as: Worsening fevers, pain, inability to handle own secretions, etc., recommend follow-up.

## 2023-04-19 ENCOUNTER — APPOINTMENT (OUTPATIENT)
Dept: LAB | Facility: OTHER | Age: 33
End: 2023-04-19
Payer: COMMERCIAL

## 2023-04-20 ENCOUNTER — MYC MEDICAL ADVICE (OUTPATIENT)
Dept: FAMILY MEDICINE | Facility: OTHER | Age: 33
End: 2023-04-20
Payer: COMMERCIAL

## 2023-04-20 DIAGNOSIS — Z02.0 ENCOUNTER FOR SCHOOL HEALTH EXAMINATION: Primary | ICD-10-CM

## 2023-04-21 ENCOUNTER — LAB (OUTPATIENT)
Dept: LAB | Facility: OTHER | Age: 33
End: 2023-04-21
Attending: NURSE PRACTITIONER
Payer: COMMERCIAL

## 2023-04-21 DIAGNOSIS — Z02.0 ENCOUNTER FOR SCHOOL HEALTH EXAMINATION: Primary | ICD-10-CM

## 2023-04-21 PROCEDURE — 36415 COLL VENOUS BLD VENIPUNCTURE: CPT | Mod: ZL

## 2023-04-21 PROCEDURE — 86787 VARICELLA-ZOSTER ANTIBODY: CPT | Mod: ZL

## 2023-04-24 ENCOUNTER — LAB (OUTPATIENT)
Dept: LAB | Facility: OTHER | Age: 33
End: 2023-04-24
Attending: STUDENT IN AN ORGANIZED HEALTH CARE EDUCATION/TRAINING PROGRAM
Payer: COMMERCIAL

## 2023-04-24 ENCOUNTER — TELEPHONE (OUTPATIENT)
Dept: LAB | Facility: OTHER | Age: 33
End: 2023-04-24

## 2023-04-24 DIAGNOSIS — Z13.9 SCREENING FOR CONDITION: ICD-10-CM

## 2023-04-24 DIAGNOSIS — Z13.9 SCREENING FOR CONDITION: Primary | ICD-10-CM

## 2023-04-24 LAB
HOLD SPECIMEN: NORMAL
VZV IGG SER QL IA: 689.9 INDEX
VZV IGG SER QL IA: POSITIVE

## 2023-04-24 PROCEDURE — 86481 TB AG RESPONSE T-CELL SUSP: CPT | Mod: ZL

## 2023-04-24 PROCEDURE — 36415 COLL VENOUS BLD VENIPUNCTURE: CPT | Mod: ZL

## 2023-04-24 NOTE — PROGRESS NOTES
Hello, pt needed QFT for school and came in last week but was cancelled by the reference lab due to insufficient sample. Since [facility] is out this week, would you be willing to put another order in for this pt? Thank you :)

## 2023-04-26 LAB
GAMMA INTERFERON BACKGROUND BLD IA-ACNC: 0.07 IU/ML
M TB IFN-G BLD-IMP: NEGATIVE
M TB IFN-G CD4+ BCKGRND COR BLD-ACNC: 9.93 IU/ML
MITOGEN IGNF BCKGRD COR BLD-ACNC: -0.01 IU/ML
MITOGEN IGNF BCKGRD COR BLD-ACNC: 0 IU/ML
QUANTIFERON MITOGEN: 10 IU/ML
QUANTIFERON NIL TUBE: 0.07 IU/ML
QUANTIFERON TB1 TUBE: 0.06 IU/ML
QUANTIFERON TB2 TUBE: 0.07

## 2023-05-23 ENCOUNTER — MYC MEDICAL ADVICE (OUTPATIENT)
Dept: FAMILY MEDICINE | Facility: OTHER | Age: 33
End: 2023-05-23
Payer: COMMERCIAL

## 2023-05-23 DIAGNOSIS — I10 BENIGN ESSENTIAL HYPERTENSION: Primary | ICD-10-CM

## 2023-05-24 RX ORDER — LOSARTAN POTASSIUM 25 MG/1
25 TABLET ORAL DAILY
Qty: 30 TABLET | Refills: 0 | Status: SHIPPED | OUTPATIENT
Start: 2023-05-24 | End: 2023-06-30

## 2023-06-30 ENCOUNTER — OFFICE VISIT (OUTPATIENT)
Dept: FAMILY MEDICINE | Facility: OTHER | Age: 33
End: 2023-06-30
Attending: NURSE PRACTITIONER
Payer: COMMERCIAL

## 2023-06-30 VITALS
BODY MASS INDEX: 33.82 KG/M2 | HEART RATE: 68 BPM | HEIGHT: 66 IN | TEMPERATURE: 98 F | OXYGEN SATURATION: 96 % | SYSTOLIC BLOOD PRESSURE: 127 MMHG | DIASTOLIC BLOOD PRESSURE: 87 MMHG | WEIGHT: 210.4 LBS | RESPIRATION RATE: 16 BRPM

## 2023-06-30 DIAGNOSIS — I10 BENIGN ESSENTIAL HYPERTENSION: ICD-10-CM

## 2023-06-30 PROCEDURE — 99213 OFFICE O/P EST LOW 20 MIN: CPT | Performed by: NURSE PRACTITIONER

## 2023-06-30 RX ORDER — LOSARTAN POTASSIUM 25 MG/1
25 TABLET ORAL DAILY
Qty: 90 TABLET | Refills: 3 | Status: SHIPPED | OUTPATIENT
Start: 2023-06-30 | End: 2023-08-15 | Stop reason: DRUGHIGH

## 2023-06-30 ASSESSMENT — PAIN SCALES - GENERAL: PAINLEVEL: NO PAIN (0)

## 2023-06-30 NOTE — NURSING NOTE
Patient presents today for follow up on blood pressure.    Medication Reconciliation Complete    Grace Hanley LPN  6/30/2023 3:29 PM

## 2023-06-30 NOTE — PROGRESS NOTES
"  Assessment & Plan   Problem List Items Addressed This Visit    None  Visit Diagnoses     Benign essential hypertension        Relevant Medications    losartan (COZAAR) 25 MG tablet         Tolerating losartan well, refill x1 year.  She will continue to monitor blood pressures intermittently at home and follow-up if these start to creep.  Plan to follow-up annually and as needed.    Prescription drug management  19 minutes spent by me on the date of the encounter doing chart review, history and exam, documentation and further activities per the note       BMI:   Estimated body mass index is 34.48 kg/m  as calculated from the following:    Height as of this encounter: 1.664 m (5' 5.5\").    Weight as of this encounter: 95.4 kg (210 lb 6.4 oz).           No follow-ups on file.    KEMAR Briones [facility]   Marilee is a 33 year old, presenting for the following health issues:  RECHECK (Blood pressure)    History of Present Illness       Hypertension: She presents for follow up of hypertension.  She does check blood pressure  regularly outside of the clinic. Outside blood pressures have been over 140/90. She does not follow a low salt diet.     She eats 2-3 servings of fruits and vegetables daily.She consumes 0 sweetened beverage(s) daily.She exercises with enough effort to increase her heart rate 30 to 60 minutes per day.  She exercises with enough effort to increase her heart rate 4 days per week. She is missing 1 dose(s) of medications per week.     She comes in today to follow-up on hypertension.  She has been taking losartan 25 mg daily.  She reports that she has to urinate more frequently but otherwise is feeling well.  She is tolerating medication without significant side effects.  Home blood pressure readings have been 120-100 30s over 80s to 90s.  She would like to send this medication and is comfortable with how she is responding to this.      Review of Systems " "  See above      Objective    /87   Pulse 68   Temp 98  F (36.7  C)   Resp 16   Ht 1.664 m (5' 5.5\")   Wt 95.4 kg (210 lb 6.4 oz)   LMP 06/15/2023 (Approximate)   SpO2 96%   BMI 34.48 kg/m    Body mass index is 34.48 kg/m .  Physical Exam   GENERAL: healthy, alert and no distress  EYES: Eyes grossly normal to inspection  RESP: lungs clear to auscultation - no rales, rhonchi or wheezes  CV: regular rate and rhythm, normal S1 S2  NEURO: Normal strength and tone, mentation intact and speech normal  PSYCH: mentation appears normal, affect normal/bright        "

## 2023-08-15 ENCOUNTER — MYC MEDICAL ADVICE (OUTPATIENT)
Dept: FAMILY MEDICINE | Facility: OTHER | Age: 33
End: 2023-08-15
Payer: COMMERCIAL

## 2023-08-15 DIAGNOSIS — I10 BENIGN ESSENTIAL HYPERTENSION: Primary | ICD-10-CM

## 2023-08-15 RX ORDER — LOSARTAN POTASSIUM 50 MG/1
50 TABLET ORAL DAILY
Qty: 90 TABLET | Refills: 3 | Status: SHIPPED | OUTPATIENT
Start: 2023-08-15

## 2025-05-17 ENCOUNTER — HEALTH MAINTENANCE LETTER (OUTPATIENT)
Age: 35
End: 2025-05-17